# Patient Record
Sex: MALE | Race: WHITE | NOT HISPANIC OR LATINO | Employment: PART TIME | URBAN - METROPOLITAN AREA
[De-identification: names, ages, dates, MRNs, and addresses within clinical notes are randomized per-mention and may not be internally consistent; named-entity substitution may affect disease eponyms.]

---

## 2018-04-20 ENCOUNTER — TELEPHONE (OUTPATIENT)
Dept: TRANSPLANT | Facility: CLINIC | Age: 53
End: 2018-04-20

## 2018-04-20 DIAGNOSIS — I50.9 CONGESTIVE HEART FAILURE, UNSPECIFIED HF CHRONICITY, UNSPECIFIED HEART FAILURE TYPE: Primary | ICD-10-CM

## 2018-04-20 NOTE — TELEPHONE ENCOUNTER
"Called to schedule consult appointment. Recording says the number being called "is not reachable". Will plan to call back.  "

## 2018-04-23 NOTE — TELEPHONE ENCOUNTER
"Called to schedule appointment. No answer. Unable to leave message. Recording says "not reachable". Records from referring previously scanned into media section of EPIC.  "

## 2018-05-04 NOTE — TELEPHONE ENCOUNTER
Spoke to patient. My contact information given. States not aware of referral. Plan to call me back after speaking with Dr. Gannon.

## 2018-05-07 NOTE — TELEPHONE ENCOUNTER
REFERRAL NOTE:    Osei Castorena has been referred to the pre-heart transplant office for consideration for orthotopic heart transplantation by Ricky Gannon. Patient's appointments have been scheduled for 05/14/18. Information was provided and questions were answered. Copies of AHF/ Transplant handout, appointment letter and directions to [facility] were mailed to the patient. My contact information was given.Pleasant.Call PRN. Records from Dr Gannon office is in media section of [employer]. Called Dr Gannon office ([phone]) to inform of appointment with Dr Broderick. Left message along with information on Nona Stern's voicemail. Call PRN.

## 2018-05-14 ENCOUNTER — EDUCATION (OUTPATIENT)
Dept: TRANSPLANT | Facility: CLINIC | Age: 53
End: 2018-05-14

## 2018-05-14 ENCOUNTER — INITIAL CONSULT (OUTPATIENT)
Dept: TRANSPLANT | Facility: CLINIC | Age: 53
End: 2018-05-14
Payer: MEDICAID

## 2018-05-14 ENCOUNTER — HOSPITAL ENCOUNTER (OUTPATIENT)
Dept: PULMONOLOGY | Facility: CLINIC | Age: 53
Discharge: HOME | End: 2018-05-14
Payer: MEDICAID

## 2018-05-14 VITALS
BODY MASS INDEX: 21.49 KG/M2 | WEIGHT: 129.63 LBS | HEIGHT: 65 IN | BODY MASS INDEX: 21.6 KG/M2 | DIASTOLIC BLOOD PRESSURE: 58 MMHG | HEART RATE: 74 BPM | HEIGHT: 65 IN | WEIGHT: 129 LBS | SYSTOLIC BLOOD PRESSURE: 95 MMHG

## 2018-05-14 DIAGNOSIS — I50.42 CHRONIC COMBINED SYSTOLIC AND DIASTOLIC HEART FAILURE: Primary | ICD-10-CM

## 2018-05-14 DIAGNOSIS — Z95.820 S/P ANGIOPLASTY WITH STENT: ICD-10-CM

## 2018-05-14 DIAGNOSIS — E78.5 HYPERLIPIDEMIA, UNSPECIFIED HYPERLIPIDEMIA TYPE: ICD-10-CM

## 2018-05-14 DIAGNOSIS — I50.9 CONGESTIVE HEART FAILURE, UNSPECIFIED HF CHRONICITY, UNSPECIFIED HEART FAILURE TYPE: ICD-10-CM

## 2018-05-14 DIAGNOSIS — I21.9 MYOCARDIAL INFARCTION, UNSPECIFIED MI TYPE, UNSPECIFIED ARTERY: ICD-10-CM

## 2018-05-14 DIAGNOSIS — G47.00 INSOMNIA, UNSPECIFIED TYPE: ICD-10-CM

## 2018-05-14 PROCEDURE — 94618 PULMONARY STRESS TESTING: CPT | Mod: PBBFAC,NTX | Performed by: INTERNAL MEDICINE

## 2018-05-14 PROCEDURE — 99213 OFFICE O/P EST LOW 20 MIN: CPT | Mod: PBBFAC,TXP,25 | Performed by: INTERNAL MEDICINE

## 2018-05-14 PROCEDURE — 99999 PR PBB SHADOW E&M-EST. PATIENT-LVL III: CPT | Mod: PBBFAC,TXP,, | Performed by: INTERNAL MEDICINE

## 2018-05-14 PROCEDURE — 99204 OFFICE O/P NEW MOD 45 MIN: CPT | Mod: S$PBB,TXP,, | Performed by: INTERNAL MEDICINE

## 2018-05-14 PROCEDURE — 94618 PULMONARY STRESS TESTING: CPT | Mod: 26,S$PBB,NTX, | Performed by: INTERNAL MEDICINE

## 2018-05-14 RX ORDER — CLOPIDOGREL BISULFATE 75 MG/1
75 TABLET ORAL DAILY
COMMUNITY
End: 2018-10-25 | Stop reason: SDUPTHER

## 2018-05-14 RX ORDER — CARVEDILOL 3.12 MG/1
3.12 TABLET ORAL 2 TIMES DAILY WITH MEALS
COMMUNITY
End: 2018-06-06 | Stop reason: ALTCHOICE

## 2018-05-14 RX ORDER — ATORVASTATIN CALCIUM 80 MG/1
80 TABLET, FILM COATED ORAL DAILY
COMMUNITY
End: 2018-10-25 | Stop reason: SDUPTHER

## 2018-05-14 RX ORDER — POTASSIUM CHLORIDE 1500 MG/1
20 TABLET, EXTENDED RELEASE ORAL DAILY
COMMUNITY
End: 2018-06-06 | Stop reason: ALTCHOICE

## 2018-05-14 RX ORDER — NAPROXEN SODIUM 220 MG/1
81 TABLET, FILM COATED ORAL DAILY
COMMUNITY
End: 2018-10-25 | Stop reason: SDUPTHER

## 2018-05-14 NOTE — LETTER
May 14, 2018        Ricky Gannon  [address]  [address]  [place] [address]  Phone: [phone]  Fax: [phone]             [facility] [address]  [facility] [address]  Phone: [phone]   Patient: Osei Castorena   MR Number: [id number]   YOB: 1965   Date of Visit: 5/14/2018       Dear Dr. Ricky Gannon    Thank you for referring Osei Castorena to me for evaluation. Attached you will find relevant portions of my assessment and plan of care.    If you have questions, please do not hesitate to call me. I look forward to following Osei Castorena along with you.    Sincerely,    Chad Broderick MD    Enclosure    If you would like to receive this communication electronically, please contact [email or web address] or [phone] to request [employer] Link access.    [employer] Link is a tool which provides read-only access to select patient information with whom you have a relationship. Its easy to use and provides real time access to review your patients record including encounter summaries, notes, results, and demographic information.    If you feel you have received this communication in error or would no longer like to receive these types of communications, please e-mail [email or web address]

## 2018-05-14 NOTE — PROGRESS NOTES
"PRE-EDUCATION BOOKLET NOTE:    Met with Osei Castorena and had a brief discussion regarding the advanced heart failure evaluation process including options for heart transplantation and/or left ventricular assist device (LVAD) implantation.     Heart Transplant Educational Booklet given to patient, which included the following handouts:  · Treatment options for Advanced Heart Failure: A Referral Guide for patients  · Pre Heart Transplant Coordinator Team Contact Information   · Recipient Informed Consent   · Wellness Contract  · Multiple Listing Protocol and UNOS toll free numbers   · VAD Education Packet   · Advanced Directives  · 8 Step Plan for Heart Failure Patients     Significant History:  · Prior sternotomies: no  · ICD (if yes, indicate brand of device): LIFEVEST x last 3 months  · Blood transfusions (if yes, enter date and location): no  · Angiography (if yes, enter date and location): yes/ stents  · Colonoscopy:  NO, but brother and mother both  of COLON CA last year.  · Pap smear (if yes, enter date and location): n/a  · Mammogram (if yes, enter date and location): n//a  · Tobacco history (if yes, enter amount and date quit if applicable): yes, current cigarette smoker; 2-3 cigs per day; states "cut down a lot"; smoking since age 16.    Question and answer session was conducted.  Patient was advised to bring the educational packet to future appointments and/or admissions.  Patient was encouraged to contact the coordinator team with any questions or concerns. Contact information provided. Understanding verbalized.  Pt will return on 18 for RHC and possible admission.    "

## 2018-05-14 NOTE — PROCEDURES
Osei Castorena is a 52 y.o.  male patient, who presents for a 6 minute walk test ordered by MD. Davie.  The diagnosis is Pre Heart Transplant.  The patient's BMI is 21.5 kg/m2.  Predicted distance (lower limit of normal) is 519.38 meters.      Test Results:    The test was completed without stopping.    The total time walked was 360 seconds.  During walking, the patient reported:  Leg pain, Lightheadedness. The patient used no assistive devices  during testing.     05/14/2018---------Distance: 426.72 meters (1400 feet)     O2 Sat % Supplemental Oxygen Heart Rate Blood Pressure Ced Scale   Pre-exercise  (Resting) 100 % Room Air 89 bpm 113/69 mmHg 4   During Exercise 100 % Room Air 100 bpm 132/71 mmHg 4   Post-exercise  (Recovery) 99 % Room Air  84 bpm   mmHg       Recovery Time: 101 seconds    Performing nurse/tech: SHERI No.      PREVIOUS STUDY:   The patient has not had a previous study.      CLINICAL INTERPRETATION:  Six minute walk distance is 426.72 meters (1400 feet) with somewhat heavy dyspnea.  During exercise, there was no significant desaturation while breathing room air.  Both blood pressure and heart rate remained stable with walking.  The patient reported non-pulmonary symptoms during exercise.  No previous study performed.  Based upon age and body mass index, exercise capacity is less than predicted.

## 2018-05-14 NOTE — PROGRESS NOTES
"Subjective:   Initial evaluation of heart transplant candidacy.    HPI:  Mr. Castorena is a 52 y.o. year old White male who has presents to be considered for advanced surgical options (LVAD/OHT).      He has an acute myocardial infarction in 2017 and underwent coronary stents in the RCA, smoker, heart failure with systolic dysfuntion since his acute MI, FRENCH FC III NYHA, PND and orthopnea and weakness. On carvedilol small dose because of low BP. Has a life vest    Past Medical History:   Diagnosis Date    CHF (congestive heart failure)     Hyperlipidemia     Myocardial infarction      Past Surgical History:   Procedure Laterality Date    APPENDECTOMY         No family history on file.    Review of Systems   Constitution: Negative for chills, decreased appetite, diaphoresis, fever, weakness, malaise/fatigue, night sweats, weight gain and weight loss.   Cardiovascular: Positive for dyspnea on exertion, orthopnea and paroxysmal nocturnal dyspnea. Negative for chest pain, claudication, cyanosis, irregular heartbeat, leg swelling, near-syncope and palpitations.   Respiratory: Negative for cough, hemoptysis, shortness of breath, sleep disturbances due to breathing, snoring, sputum production and wheezing.    Gastrointestinal: Negative for abdominal pain and diarrhea.       Objective:   Blood pressure (!) 95/58, pulse 74, height 5' 5" (1.651 m), weight 58.8 kg (129 lb 10.1 oz).body mass index is 21.57 kg/m².    Physical Exam   Constitutional: He is oriented to person, place, and time. He appears well-developed and well-nourished.   HENT:   Head: Normocephalic and atraumatic.   Eyes: Conjunctivae and EOM are normal. Pupils are equal, round, and reactive to light.   Neck: Normal range of motion. Neck supple. Hepatojugular reflux present. No JVD present.   Cardiovascular: Normal rate and regular rhythm.  PMI is displaced.  Exam reveals no gallop and no friction rub.    No murmur heard.  Pulmonary/Chest: No respiratory " distress. He has no wheezes. He has rales in the right middle field, the right lower field, the left middle field and the left lower field. He exhibits no tenderness.   Abdominal: Soft. Bowel sounds are normal. He exhibits no distension and no mass. There is no hepatosplenomegaly, splenomegaly or hepatomegaly. There is no tenderness. There is no rebound, no guarding and no CVA tenderness.   No hepatoslenomegaly   Musculoskeletal: Normal range of motion. He exhibits no edema or tenderness.   Neurological: He is alert and oriented to person, place, and time. He has normal reflexes. No cranial nerve deficit. He exhibits normal muscle tone. Coordination normal.   Skin: Skin is warm and dry.   Psychiatric: He has a normal mood and affect.       Labs:      Chemistry        Component Value Date/Time     05/14/2018 1330    K 4.8 05/14/2018 1330     05/14/2018 1330    CO2 26 05/14/2018 1330    BUN 11 05/14/2018 1330    CREATININE 0.9 05/14/2018 1330     05/14/2018 1330        Component Value Date/Time    CALCIUM 9.4 05/14/2018 1330    ALKPHOS 83 05/14/2018 1330    AST 16 05/14/2018 1330    ALT 14 05/14/2018 1330    BILITOT 0.4 05/14/2018 1330    ESTGFRAFRICA >60.0 05/14/2018 1330    EGFRNONAA >60.0 05/14/2018 1330          No results found for: MG  Lab Results   Component Value Date    WBC 9.15 05/14/2018    HGB 13.6 (L) 05/14/2018    HCT 41.5 05/14/2018    MCV 84 05/14/2018     05/14/2018     BNP   Date Value Ref Range Status   05/14/2018 853 (H) 0 - 99 pg/mL Final     Comment:     Values of less than 100 pg/ml are consistent with non-CHF populations.         Labs were reviewed with the patient.    Assessment:      1. Hyperlipidemia, unspecified hyperlipidemia type    2. S/P angioplasty with stent    3. Chronic combined systolic and diastolic heart failure    4. Myocardial infarction, unspecified MI type, unspecified artery    5. Insomnia, unspecified type        Plan:     Patient is now NYHA III  ACC stage D  Recommend 2 gram sodium restriction and 1500cc fluid restriction.  Encourage physical activity with graded exercise program.  Requested patient to weigh themselves daily, and to notify us if their weight increases by more than 3 lbs in 1 day or 5 lbs in 1 week.     Transplant Candidacy: Patient is a 52 y.o. year old male with heart failure is being seen for possible LVAD and OHT. In my opinion, he is  a marginal LVAD and OHT candidate. Patient   did meet with MCS and/or pre-transplant coordinator at the end of this visit for workup. he is scheduled for risk stratification testing with CPX/RHC. The patient will follow up with pre-transplant.    UNOS Patient Status  Functional Status: 70% - Cares for self: unable to carry on normal activity or active work  Physical Capacity: Limited Mobility  Working for Income: No  If no, reason not working: Disability    Chad Broderick MD

## 2018-05-21 DIAGNOSIS — I50.42 CHRONIC COMBINED SYSTOLIC AND DIASTOLIC CONGESTIVE HEART FAILURE: Primary | ICD-10-CM

## 2018-05-21 DIAGNOSIS — E78.5 HYPERLIPIDEMIA: ICD-10-CM

## 2018-05-22 ENCOUNTER — SURGERY (OUTPATIENT)
Age: 53
End: 2018-05-22

## 2018-05-22 ENCOUNTER — HOSPITAL ENCOUNTER (OUTPATIENT)
Facility: HOSPITAL | Age: 53
Discharge: HOME | End: 2018-05-22
Attending: INTERNAL MEDICINE | Admitting: INTERNAL MEDICINE
Payer: MEDICAID

## 2018-05-22 PROCEDURE — C1894 INTRO/SHEATH, NON-LASER: HCPCS | Mod: NTX

## 2018-05-22 PROCEDURE — 93451 RIGHT HEART CATH: CPT | Mod: 26,NTX,, | Performed by: INTERNAL MEDICINE

## 2018-05-22 PROCEDURE — 25000003 PHARM REV CODE 250: Mod: NTX

## 2018-05-22 NOTE — BRIEF OP NOTE
Right Heart Catheterization Hemodynamics:    BP: 97/68, 78    RA: 8  RV: 46/8  PA: 46/24, 31  PCWP: 21  TPG: 10  PVR: 3.44  SVR: 1931    PA sat: 50%  AO sat: 98%  PCW sat: 90%    Eleni CO/CI: 2.9/1.7    LUCRETIA: 2.75    Conclusion:  1. Borderline normal Right and mild-moderately elevated Left Heart filling pressures  2. Severely reduced cardiac output and moderately-reduced cardiac index  3. Mild to moderate pulmonary hypertension  4. No previous RHC to compare    A/P  - reviewing 6MWT, he was able to walk 426m; Pt endorses NYHA Class II-III symptoms and still works for a [employer] company. Would like to start spironolactone but patient did not bring meds with him. He is reportedly on a diuretic but not sure of name or dose. Will f/u next visit.  - will bring back next week for CPX and labs to further risk stratify. His reported symptoms (and 6MWT) are not as bad as his CO/CI. Will see what CPX shows.  - Instructed to bring meds to clinic next week and call with any questions or problems  - Additionally, spoke to patient about need to have his teeth looked at/worked on. He has had issues with Dentists wanting to see him given that he is on Plavix (AJAY 12/2017)    Patient tolerated the procedure well. Please see complete RHC procedure note for full details and results. Stable to return from cath lab to home.     LESTER Hernadez MD  Transplant Cardiology

## 2018-05-22 NOTE — DISCHARGE INSTRUCTIONS

## 2018-05-22 NOTE — INTERVAL H&P NOTE
Cardiac Cath Lab History and Physical  - there has been no significant interval change since this clinic visit.     History of Present Illness:  52 y.o. CAD and ischemic CMP with resulting chronic combined HF who presents to cath lab for RHC for assessment of hemodynamics as part of workup for advanced heart failure therapies. He has no new complaints.      Assessment/Plan:  iCMP with chronic combined HF  - I have explained the risks, benefits, and alternatives of the procedure in detail. The patient expresses understanding and all questions have been answered. The patient agrees to the proceed as planned.  - proceed with RHC via RIJ  using 7Fr Sheath  - Micropuncture access needle will be used to minimize bleeding risk.    LESTER Hernadez MD  Transplant Cardiology

## 2018-05-22 NOTE — DISCHARGE SUMMARY
Admit date: 5/22/2018    Discharge date and time: 05/22/2018 05/22/2018    Admitting Physician: LESTER Hernadez MD    Discharge Physician:  LESTER Hernadez MD    Admission Diagnoses: Chronic systolic heart failure [428.22]  Organ transplant candidate [V49.83]    Discharge Diagnoses: Chronic systolic heart failure [428.22]  Organ transplant candidate [V49.83]    Discharged Condition: stable    Significant Diagnostic Studies: Right Heart Catheterization     Disposition: Home or Self Care    Patient Instructions:   -cont home meds  **Pt reported being on a diuretic but was not sure which one and how much strength. I would like to add spironolactone and stop potassium but want to know more of what his meds are. Told to bring all his meds to clinic next week. **    AFTER THE PROCEDURE:  -You may remove the bandage in 24 hours and wash with soap and water.  -You may shower, but do not soak in a tub for three days.   PRECAUTIONS FOR THE NEXT 24 HOURS:  -If you need to cough, sneeze, have a bowel movement, or bear down, hold pressure over your bandage.  -Do not  anything heavier than a gallon of milk(about 5 pounds)  -Avoid excessive bending over.  SYMPTOMS TO WATCH FOR AND REPORT TO YOUR DOCTOR:  -BLEEDING: hold pressure over the site until bleeding stops. Proceed to Emergency Room by ambulance (do not drive yourself) if unable to stop bleeding. Notify your doctor.  -HEMATOMA(hard bruise under the skin): Lei around the bruise if one develops. Call your doctor if it increases in size or if you have difficulty talking, swallowing, breathing or anything unusual.  SIGNS OF INFECTION:Fever (temperature over 100.5 F), pus or redness  -RASH  -CHEST PAIN OR SHORTNESS OF BREATH    You may call you coordinator in the Heart Failure/Heart Transplant/Pulmonary Hypertension Clinic at [phone] during normal business hours(Monday through Friday from 8 A.M. to 5 P.M.) After hours, call the Heart Transplant Service doctor on  call at [phone].    Activity: no heavy lifting   Diet: cardiac diet    Follow-up with 1-2 weeks with labs and CPX    Signed:  LESTER Hernadez MD  Transplant Cardiology

## 2018-05-24 DIAGNOSIS — I50.42 CHRONIC COMBINED SYSTOLIC AND DIASTOLIC HEART FAILURE: Primary | ICD-10-CM

## 2018-06-06 ENCOUNTER — OFFICE VISIT (OUTPATIENT)
Dept: TRANSPLANT | Facility: CLINIC | Age: 53
End: 2018-06-06
Payer: MEDICAID

## 2018-06-06 ENCOUNTER — HOSPITAL ENCOUNTER (OUTPATIENT)
Dept: CARDIOLOGY | Facility: CLINIC | Age: 53
Discharge: HOME | End: 2018-06-06
Attending: INTERNAL MEDICINE
Payer: MEDICAID

## 2018-06-06 ENCOUNTER — EDUCATION (OUTPATIENT)
Dept: TRANSPLANT | Facility: CLINIC | Age: 53
End: 2018-06-06

## 2018-06-06 VITALS
SYSTOLIC BLOOD PRESSURE: 95 MMHG | HEART RATE: 83 BPM | WEIGHT: 120.56 LBS | BODY MASS INDEX: 20.09 KG/M2 | DIASTOLIC BLOOD PRESSURE: 60 MMHG | HEIGHT: 65 IN

## 2018-06-06 DIAGNOSIS — I50.42 CHRONIC COMBINED SYSTOLIC AND DIASTOLIC HEART FAILURE: Primary | ICD-10-CM

## 2018-06-06 DIAGNOSIS — I25.10 CORONARY ARTERY DISEASE INVOLVING NATIVE CORONARY ARTERY OF NATIVE HEART WITHOUT ANGINA PECTORIS: ICD-10-CM

## 2018-06-06 DIAGNOSIS — I21.9 MYOCARDIAL INFARCTION, UNSPECIFIED MI TYPE, UNSPECIFIED ARTERY: ICD-10-CM

## 2018-06-06 DIAGNOSIS — I25.5 ISCHEMIC CARDIOMYOPATHY: ICD-10-CM

## 2018-06-06 DIAGNOSIS — I50.42 CHRONIC COMBINED SYSTOLIC AND DIASTOLIC HEART FAILURE: ICD-10-CM

## 2018-06-06 LAB — DIASTOLIC DYSFUNCTION: NO

## 2018-06-06 PROCEDURE — 99213 OFFICE O/P EST LOW 20 MIN: CPT | Mod: PBBFAC,NTX,25 | Performed by: INTERNAL MEDICINE

## 2018-06-06 PROCEDURE — 99999 PR PBB SHADOW E&M-EST. PATIENT-LVL III: CPT | Mod: PBBFAC,TXP,, | Performed by: INTERNAL MEDICINE

## 2018-06-06 PROCEDURE — 94621 CARDIOPULM EXERCISE TESTING: CPT | Mod: PBBFAC,NTX | Performed by: INTERNAL MEDICINE

## 2018-06-06 RX ORDER — SPIRONOLACTONE 25 MG/1
25 TABLET ORAL DAILY
Qty: 30 TABLET | Refills: 11 | Status: SHIPPED | OUTPATIENT
Start: 2018-06-06 | End: 2018-10-25 | Stop reason: SDUPTHER

## 2018-06-06 RX ORDER — LISINOPRIL 5 MG/1
1 TABLET ORAL DAILY
COMMUNITY
Start: 2018-06-05 | End: 2018-10-25

## 2018-06-06 RX ORDER — METOPROLOL SUCCINATE 25 MG/1
1 TABLET, EXTENDED RELEASE ORAL DAILY
Status: ON HOLD | COMMUNITY
Start: 2018-06-05 | End: 2018-09-28 | Stop reason: CLARIF

## 2018-06-06 RX ORDER — FUROSEMIDE 20 MG/1
1 TABLET ORAL DAILY
COMMUNITY
Start: 2018-06-05 | End: 2018-10-25 | Stop reason: SDUPTHER

## 2018-06-06 NOTE — PROGRESS NOTES
Met with patient at end of visit with Dr. Hernadez.  Pt voiced that he is interested in exploring option for LVAD; understands that he does not qualify for OHT at this time as he continues to use tobacco.  We discussed the rigors of having an LVAD, particularly as patient lives alone, in [place] (states 4 hour drive from here).  Pt does drive and has a friend that may be able to serve as caregiver; friend has experience with family member that has a VAD.  He states that getting dental care has been challenging as he is on Plavix (5 cardiac stents, < than one year ago.)    Pt will return in approx 2 weeks for further risk stratification; will see a SW and plan to bring potential caregiver.  He will have labs at [facility] next week ([facility]) per Dr. Hernadez; outside order provided.  Confirmed that patient has my contact information and encouraged him to call with any questions/concerns.  Understanding verbalized.

## 2018-06-06 NOTE — LETTER
June 6, 2018        Ricky Gannon  [address]  [address]  [place] [address]  Phone: [phone]  Fax: [phone]             [facility] [address]  [facility] [address]  Phone: [phone]   Patient: Osei Castorena   MR Number: [id number]   YOB: 1965   Date of Visit: 6/6/2018       Dear Dr. Ricky Gannon    Thank you for referring Osei Castorena to me for evaluation. Attached you will find relevant portions of my assessment and plan of care.    If you have questions, please do not hesitate to call me. I look forward to following Osei Castorena along with you.    Sincerely,    Tunde Hernadez MD    Enclosure    If you would like to receive this communication electronically, please contact [email or web address] or [phone] to request [employer] access.    [employer] is a tool which provides read-only access to select patient information with whom you have a relationship. Its easy to use and provides real time access to review your patients record including encounter summaries, notes, results, and demographic information.    If you feel you have received this communication in error or would no longer like to receive these types of communications, please e-mail [email or web address]

## 2018-06-06 NOTE — PROGRESS NOTES
Subjective:   Follow up evaluation of heart transplant candidacy.    HPI:  Mr. Castorena is a 52 y.o. year old White male who has presents for f/u after being seen last month for consideration for advanced surgical options (LVAD/OHT). He was diagnosed with HF after MI in 11/2017. He had an acute myocardial infarction in 2017 and underwent coronary stents in the RCA. However, he had resulting ischemic CMP (possible combination with Hypertensive) and now chronic combined HF. Last visit was his initial visit and he was seen by my partner Dr. Broderick who referred him for RHC. His RHC numbers showed a severely reduced CO and moderately reduced CI (see below). However, he endorsed NYHA Class II-III symptoms and he went 426.7 meters on his 6MWT. He did not bring his meds to [facility] so was not 100% sure what he was taking. I asked him to bring meds back to clinic visit today and get a CPX to see if we could reconcile his risk category as well as his meds. He is currently still smoking (1-2 cigs daily on average). He is currently wearing a life vest and has for ~3+ months.      Today, he reports fatigue. He is still working as a  and reports having good days and bad. He endorses what sounds like NYHA Class II-III symptoms. His CPX today showed PkVO2 17 with no AT and Ve/VCO2 of 37. No PND or orthopnea, rare palpitations, no syncope or near-syncope.      6MWT: 427m    RHC 5/22/18:  BP: 97/68, 78    RA: 8  RV: 46/8  PA: 46/24, 31  PCWP: 21  TPG: 10  PVR: 3.44  SVR: 1931     PA sat: 50%  AO sat: 98%  PCW sat: 90%     Eleni CO/CI: 2.9/1.7     LUCRETIA: 2.75     Conclusion:  1. Borderline normal Right and mild-moderately elevated Left Heart filling pressures  2. Severely reduced cardiac output and moderately-reduced cardiac index  3. Mild to moderate pulmonary hypertension  4. No previous RHC to compare    Past Medical History:   Diagnosis Date    CHF (congestive heart failure)     Hyperlipidemia     Myocardial infarction   "      Current Outpatient Prescriptions:     aspirin 81 MG Chew, Take 81 mg by mouth once daily., Disp: , Rfl:     atorvastatin (LIPITOR) 80 MG tablet, Take 80 mg by mouth once daily., Disp: , Rfl:     clopidogrel (PLAVIX) 75 mg tablet, Take 75 mg by mouth once daily., Disp: , Rfl:     furosemide (LASIX) 20 MG tablet, Take 1 tablet by mouth once daily., Disp: , Rfl:     lisinopril (PRINIVIL,ZESTRIL) 5 MG tablet, Take 1 tablet by mouth once daily., Disp: , Rfl:     metoprolol succinate (TOPROL-XL) 25 MG 24 hr tablet, Take 1 tablet by mouth once daily., Disp: , Rfl:     spironolactone (ALDACTONE) 25 MG tablet, Take 1 tablet (25 mg total) by mouth once daily., Disp: 30 tablet, Rfl: 11    Review of Systems   Constitution: Negative for chills, decreased appetite, diaphoresis, fever, weakness, malaise/fatigue, night sweats, weight gain and weight loss.   Cardiovascular: Positive for dyspnea on exertion, orthopnea and paroxysmal nocturnal dyspnea. Negative for chest pain, claudication, cyanosis, irregular heartbeat, leg swelling, near-syncope and palpitations.   Respiratory: Negative for cough, hemoptysis, shortness of breath, sleep disturbances due to breathing, snoring, sputum production and wheezing.    Gastrointestinal: Negative for abdominal pain and diarrhea.       Objective:   Blood pressure 95/60, pulse 83, height 5' 5" (1.651 m), weight 54.7 kg (120 lb 9.5 oz).body mass index is 20.07 kg/m².    Physical Exam   Constitutional: He is oriented to person, place, and time. He appears well-developed.   Thin; (short stature, short arms, low set ears and triangularly shaped face)   HENT:   Head: Normocephalic and atraumatic.   Eyes: Conjunctivae and EOM are normal. Pupils are equal, round, and reactive to light.   Neck: Normal range of motion. Neck supple. Hepatojugular reflux present. No JVD present.   Cardiovascular: Normal rate and regular rhythm.  PMI is displaced.  Exam reveals gallop. Exam reveals no friction " rub.    No murmur heard.  Pulmonary/Chest: No respiratory distress. He has no wheezes. He has no rales. He exhibits no tenderness.   Abdominal: Soft. Bowel sounds are normal. He exhibits no distension and no mass. There is no hepatosplenomegaly, splenomegaly or hepatomegaly. There is no tenderness. There is no rebound, no guarding and no CVA tenderness.   No hepatoslenomegaly   Musculoskeletal: Normal range of motion. He exhibits no edema or tenderness.   Neurological: He is alert and oriented to person, place, and time. He has normal reflexes. No cranial nerve deficit. He exhibits normal muscle tone. Coordination normal.   Skin: Skin is warm and dry.   Psychiatric: He has a normal mood and affect.       Labs:      Chemistry        Component Value Date/Time     06/06/2018 1225    K 3.7 06/06/2018 1225     06/06/2018 1225    CO2 27 06/06/2018 1225    BUN 10 06/06/2018 1225    CREATININE 0.9 06/06/2018 1225    GLU 98 06/06/2018 1225        Component Value Date/Time    CALCIUM 9.6 06/06/2018 1225    ALKPHOS 98 06/06/2018 1225    AST 20 06/06/2018 1225    ALT 16 06/06/2018 1225    BILITOT 0.7 06/06/2018 1225    ESTGFRAFRICA >60.0 06/06/2018 1225    EGFRNONAA >60.0 06/06/2018 1225          No results found for: MG  Lab Results   Component Value Date    WBC 7.76 06/06/2018    HGB 13.3 (L) 06/06/2018    HCT 41.3 06/06/2018    MCV 84 06/06/2018     (H) 06/06/2018     BNP   Date Value Ref Range Status   06/06/2018 601 (H) 0 - 99 pg/mL Final     Comment:     Values of less than 100 pg/ml are consistent with non-CHF populations.   05/14/2018 853 (H) 0 - 99 pg/mL Final     Comment:     Values of less than 100 pg/ml are consistent with non-CHF populations.         Labs were reviewed with the patient.    Assessment:      1. Chronic combined systolic and diastolic heart failure    2. Coronary artery disease involving native coronary artery of native heart without angina pectoris    3. Myocardial infarction,  unspecified MI type, unspecified artery    4. Ischemic cardiomyopathy        Plan:   Chronic combined HF  - recently changed from Toprol to Coreg but unclear why by primary NP  - start spironolactone 25mg daily, labs 2 days  - cont lisinopril 5mg daily  - consider titrating BB next visit  - Echo, CT scans and SW next visit; continue advanced therapy w/u; concerned about psycho-social candidacy (lives alone, unsure if he will be able to come up with a caregiver).    - also has severely poor dentition and stressed importance of seeing dentist for extraction as he will not be a candidate unless he does  - pt has the appearance of possibly having some congenital abnormality; f/u CT scans  - refer to EP for ICD    Patient is now NYHA III ACC stage D  Recommend 2 gram sodium restriction and 1500cc fluid restriction.  Encourage physical activity with graded exercise program.  Requested patient to weigh themselves daily, and to notify us if their weight increases by more than 3 lbs in 1 day or 5 lbs in 1 week.     Transplant Candidacy: Patient is a 52 y.o. year old male with heart failure is being seen for possible LVAD and OHT. In my opinion, he is  a marginal LVAD and OHT candidate. Patient   did meet with MCS and/or pre-transplant coordinator at the end of this visit for workup. he is scheduled for risk stratification testing with CPX/RHC. The patient will follow up with pre-transplant.    UNOS Patient Status  Functional Status: 70% - Cares for self: unable to carry on normal activity or active work  Physical Capacity: Limited Mobility  Working for Income: No  If no, reason not working: Disability    Tunde Hernadez MD

## 2018-06-06 NOTE — PATIENT INSTRUCTIONS
1. Stop potassium  2. Start spironolactone 25mg daily  3. Labs 2 days    Recommend 2 gram sodium restriction and 1500cc fluid restriction.  Encourage physical activity with graded exercise program.  Requested patient to weigh themselves daily, and to notify us if their weight increases by more than 3 lbs in 1 day or 5 lbs in 1 week.

## 2018-06-20 ENCOUNTER — HOSPITAL ENCOUNTER (OUTPATIENT)
Dept: RADIOLOGY | Facility: HOSPITAL | Age: 53
Discharge: HOME | End: 2018-06-20
Attending: INTERNAL MEDICINE
Payer: MEDICAID

## 2018-06-20 ENCOUNTER — OFFICE VISIT (OUTPATIENT)
Dept: TRANSPLANT | Facility: CLINIC | Age: 53
End: 2018-06-20
Payer: MEDICAID

## 2018-06-20 ENCOUNTER — HOSPITAL ENCOUNTER (OUTPATIENT)
Dept: CARDIOLOGY | Facility: CLINIC | Age: 53
Discharge: HOME | End: 2018-06-20
Attending: INTERNAL MEDICINE
Payer: MEDICAID

## 2018-06-20 ENCOUNTER — DOCUMENTATION ONLY (OUTPATIENT)
Dept: CARDIOLOGY | Facility: CLINIC | Age: 53
End: 2018-06-20

## 2018-06-20 VITALS
SYSTOLIC BLOOD PRESSURE: 104 MMHG | HEIGHT: 65 IN | WEIGHT: 124.13 LBS | DIASTOLIC BLOOD PRESSURE: 67 MMHG | HEART RATE: 70 BPM | BODY MASS INDEX: 20.68 KG/M2

## 2018-06-20 DIAGNOSIS — I50.42 CHRONIC COMBINED SYSTOLIC AND DIASTOLIC HEART FAILURE: ICD-10-CM

## 2018-06-20 DIAGNOSIS — I25.10 CORONARY ARTERY DISEASE INVOLVING NATIVE CORONARY ARTERY OF NATIVE HEART WITHOUT ANGINA PECTORIS: ICD-10-CM

## 2018-06-20 DIAGNOSIS — Z95.820 S/P ANGIOPLASTY WITH STENT: ICD-10-CM

## 2018-06-20 DIAGNOSIS — I50.42 CHRONIC COMBINED SYSTOLIC AND DIASTOLIC HEART FAILURE: Primary | ICD-10-CM

## 2018-06-20 DIAGNOSIS — I25.5 ISCHEMIC CARDIOMYOPATHY: ICD-10-CM

## 2018-06-20 LAB
DIASTOLIC DYSFUNCTION: YES
ESTIMATED PA SYSTOLIC PRESSURE: 30.04
MITRAL VALVE REGURGITATION: ABNORMAL
RETIRED EF AND QEF - SEE NOTES: 8 (ref 55–65)
TRICUSPID VALVE REGURGITATION: ABNORMAL

## 2018-06-20 PROCEDURE — 70450 CT HEAD/BRAIN W/O DYE: CPT | Mod: TC,NTX

## 2018-06-20 PROCEDURE — 74176 CT ABD & PELVIS W/O CONTRAST: CPT | Mod: 26,NTX,, | Performed by: RADIOLOGY

## 2018-06-20 PROCEDURE — 99215 OFFICE O/P EST HI 40 MIN: CPT | Mod: S$PBB,NTX,, | Performed by: INTERNAL MEDICINE

## 2018-06-20 PROCEDURE — 93306 TTE W/DOPPLER COMPLETE: CPT | Mod: 26,S$PBB,NTX, | Performed by: INTERNAL MEDICINE

## 2018-06-20 PROCEDURE — 99213 OFFICE O/P EST LOW 20 MIN: CPT | Mod: PBBFAC,25,TXP | Performed by: INTERNAL MEDICINE

## 2018-06-20 PROCEDURE — 0399T 2D ECHO WITH COLOR FLOW DOPPLER: CPT | Mod: S$PBB,NTX,, | Performed by: INTERNAL MEDICINE

## 2018-06-20 PROCEDURE — 70450 CT HEAD/BRAIN W/O DYE: CPT | Mod: 26,NTX,, | Performed by: RADIOLOGY

## 2018-06-20 PROCEDURE — 99999 PR PBB SHADOW E&M-EST. PATIENT-LVL III: CPT | Mod: PBBFAC,TXP,, | Performed by: INTERNAL MEDICINE

## 2018-06-20 PROCEDURE — 74176 CT ABD & PELVIS W/O CONTRAST: CPT | Mod: TC,TXP

## 2018-06-20 PROCEDURE — 71250 CT THORAX DX C-: CPT | Mod: 26,NTX,, | Performed by: RADIOLOGY

## 2018-06-20 RX ORDER — AMOXICILLIN AND CLAVULANATE POTASSIUM 875; 125 MG/1; MG/1
1 TABLET, FILM COATED ORAL EVERY 12 HOURS
Qty: 20 TABLET | Refills: 1 | Status: SHIPPED | OUTPATIENT
Start: 2018-06-20 | End: 2018-08-21

## 2018-06-20 NOTE — LETTER
June 20, 2018        Ricky Gannon  [address] [facility]  [address]  [place] [address]  Phone: [phone]  Fax: [phone]             [facility] [address]  [facility] [address]  Phone: [phone]   Patient: Osei Castorena   MR Number: [id number]   YOB: 1965   Date of Visit: 6/20/2018       Dear Dr. Ricky Gannon    Thank you for referring Osei Castorena to me for evaluation. Attached you will find relevant portions of my assessment and plan of care.    If you have questions, please do not hesitate to call me. I look forward to following Osei Castorena along with you.    Sincerely,    Chad Broderick MD    Enclosure    If you would like to receive this communication electronically, please contact [email or web address] or [phone] to request [employer] Link access.    [employer] Link is a tool which provides read-only access to select patient information with whom you have a relationship. Its easy to use and provides real time access to review your patients record including encounter summaries, notes, results, and demographic information.    If you feel you have received this communication in error or would no longer like to receive these types of communications, please e-mail [email or web address]

## 2018-06-20 NOTE — PROGRESS NOTES
Subjective:   Follow up evaluation of heart transplant candidacy.    HPI:  Mr. Castorena is a 52 y.o. year old White male who has presents for f/u after being seen last month for consideration for advanced surgical options (LVAD/OHT). He was diagnosed with HF after MI in 11/2017. He had an acute myocardial infarction in 2017 and underwent coronary stents in the RCA. However, he had resulting ischemic CMP (possible combination with Hypertensive) and now chronic combined HF. Last visit was his initial visit and he was seen by my partner Dr. Broderick who referred him for RHC. His RHC numbers showed a severely reduced CO and moderately reduced CI (see below). However, he endorsed NYHA Class II-III symptoms and he went 426.7 meters on his 6MWT.  He is currently still smoking (1-2 cigs daily on average). He is currently wearing a life vest and has for ~3+ months.        6MWT: 427m    RHC 5/22/18:  BP: 97/68, 78    RA: 8  RV: 46/8  PA: 46/24, 31  PCWP: 21  TPG: 10  PVR: 3.44  SVR: 1931     PA sat: 50%  AO sat: 98%  PCW sat: 90%     Eleni CO/CI: 2.9/1.7     LUCRETIA: 2.75     Conclusion:  1. Borderline normal Right and mild-moderately elevated Left Heart filling pressures  2. Severely reduced cardiac output and moderately-reduced cardiac index  3. Mild to moderate pulmonary hypertension  4. No previous RHC to compare      CONCLUSIONS     1 - Eccentric LVH with severely depressed left ventricular systolic function (EF <10%). LVEDd 5.9    2 - Severe left atrial enlargement.     3 - Impaired LV relaxation, normal LAP (grade 1 diastolic dysfunction).     4 - Right ventricle is upper limit of normal in size with normal systolic function.     5 - Mild to moderate tricuspid regurgitation.     6 - The estimated PA systolic pressure is 30 mmHg.     7 - Mild mitral regurgitation.     Metabolic Findings:    1)  Resting spirometry reveals an FVC = 3.3L which is 92% of predicted, an FEV1 of 2.3L, which is 77% of predicted and an FEV1/FVC ratio of  69%. The MVV = 92 L/min, which is 67% of predicted.    2) The respiratory exchange ratio (RER) was 0.85, suggesting poor effort.    3) The breathing reserve is calculated at 56%, which is normal. Oxygen saturation with exercise remained normal.     4) The PkVO2 was 17.1 ml/kg/min which is 53% of predicted equating to a functional capacity of 4.9 METS indicating moderate to severe functional impairment.     5) The anaerobic threshold was not attained.    6) The PkVO2 Lean was 17.98 ml/kg of lean body weight/min indicating a poor prognosis in heart failure. The VE/VCO2 [place] was 37.4.  The Resting PetCO2 was 27.3.        CONCLUSIONS   CPX Conclusions:    Moderate to severe functional impairment associated with a normal breathing reserve, normal oxygen saturation, poor effort, and a not attained AT.     Past Medical History:   Diagnosis Date    CHF (congestive heart failure)     Hyperlipidemia     Myocardial infarction        Current Outpatient Prescriptions:     aspirin 81 MG Chew, Take 81 mg by mouth once daily., Disp: , Rfl:     atorvastatin (LIPITOR) 80 MG tablet, Take 80 mg by mouth once daily., Disp: , Rfl:     clopidogrel (PLAVIX) 75 mg tablet, Take 75 mg by mouth once daily., Disp: , Rfl:     furosemide (LASIX) 20 MG tablet, Take 1 tablet by mouth once daily., Disp: , Rfl:     lisinopril (PRINIVIL,ZESTRIL) 5 MG tablet, Take 1 tablet by mouth once daily., Disp: , Rfl:     metoprolol succinate (TOPROL-XL) 25 MG 24 hr tablet, Take 1 tablet by mouth once daily., Disp: , Rfl:     spironolactone (ALDACTONE) 25 MG tablet, Take 1 tablet (25 mg total) by mouth once daily., Disp: 30 tablet, Rfl: 11    Review of Systems   Constitution: Negative for chills, decreased appetite, diaphoresis, fever, weakness, malaise/fatigue, night sweats, weight gain and weight loss.   Cardiovascular: Positive for dyspnea on exertion. Negative for chest pain, claudication, cyanosis, irregular heartbeat, leg swelling, near-syncope,  "orthopnea, palpitations and paroxysmal nocturnal dyspnea.   Respiratory: Negative for cough, hemoptysis, shortness of breath, sleep disturbances due to breathing, snoring, sputum production and wheezing.    Gastrointestinal: Negative for abdominal pain and diarrhea.       Objective:   Blood pressure 104/67, pulse 70, height 5' 5" (1.651 m), weight 56.3 kg (124 lb 1.9 oz).body mass index is 20.65 kg/m².    Physical Exam   Constitutional: He is oriented to person, place, and time. He appears well-developed.   Thin; (short stature, short arms, low set ears and triangularly shaped face)   HENT:   Head: Normocephalic and atraumatic.   Eyes: Conjunctivae and EOM are normal. Pupils are equal, round, and reactive to light.   Neck: Normal range of motion. Neck supple. Hepatojugular reflux present. No JVD present.   Cardiovascular: Normal rate and regular rhythm.  PMI is displaced.  Exam reveals gallop. Exam reveals no friction rub.    No murmur heard.  Pulmonary/Chest: No respiratory distress. He has no wheezes. He has no rales. He exhibits no tenderness.   Abdominal: Soft. Bowel sounds are normal. He exhibits no distension and no mass. There is no hepatosplenomegaly, splenomegaly or hepatomegaly. There is no tenderness. There is no rebound, no guarding and no CVA tenderness.   No hepatoslenomegaly   Musculoskeletal: Normal range of motion. He exhibits no edema or tenderness.   Neurological: He is alert and oriented to person, place, and time. He has normal reflexes. No cranial nerve deficit. He exhibits normal muscle tone. Coordination normal.   Skin: Skin is warm and dry.   Psychiatric: He has a normal mood and affect.       Labs:      Chemistry        Component Value Date/Time     06/20/2018 0730    K 3.6 06/20/2018 0730     06/20/2018 0730    CO2 27 06/20/2018 0730    BUN 12 06/20/2018 0730    CREATININE 0.9 06/20/2018 0730     06/20/2018 0730        Component Value Date/Time    CALCIUM 9.1 06/20/2018 " 0730    ALKPHOS 94 06/20/2018 0730    AST 17 06/20/2018 0730    ALT 16 06/20/2018 0730    BILITOT 0.2 06/20/2018 0730    ESTGFRAFRICA >60.0 06/20/2018 0730    EGFRNONAA >60.0 06/20/2018 0730          No results found for: MG  Lab Results   Component Value Date    WBC 7.76 06/06/2018    HGB 13.3 (L) 06/06/2018    HCT 41.3 06/06/2018    MCV 84 06/06/2018     (H) 06/06/2018     BNP   Date Value Ref Range Status   06/20/2018 723 (H) 0 - 99 pg/mL Final     Comment:     Values of less than 100 pg/ml are consistent with non-CHF populations.   06/06/2018 601 (H) 0 - 99 pg/mL Final     Comment:     Values of less than 100 pg/ml are consistent with non-CHF populations.   05/14/2018 853 (H) 0 - 99 pg/mL Final     Comment:     Values of less than 100 pg/ml are consistent with non-CHF populations.         Labs were reviewed with the patient.    Assessment:      1. Chronic combined systolic and diastolic heart failure    2. Coronary artery disease involving native coronary artery of native heart without angina pectoris    3. Ischemic cardiomyopathy    4. S/P angioplasty with stent        Plan:   Chronic combined HF  - recently changed from Toprol to 50 mg daily  - start spironolactone 25mg daily, labs 2 days  - cont lisinopril 5mg daily  - pt has the appearance of possibly having some congenital abnormality; f/u CT scans  - refer to EP for ICD    Continue medical therapy peak VO2 17.1    Patient is now NYHA III ACC stage D  Recommend 2 gram sodium restriction and 1500cc fluid restriction.  Encourage physical activity with graded exercise program.  Requested patient to weigh themselves daily, and to notify us if their weight increases by more than 3 lbs in 1 day or 5 lbs in 1 week.     Transplant Candidacy: Patient is a 52 y.o. year old male with heart failure is being seen for possible LVAD and OHT. In my opinion, he is  a marginal LVAD and OHT candidate. Patient   did meet with MCS and/or pre-transplant coordinator at the  end of this visit for workup. he is scheduled for risk stratification testing with CPX/RHC. The patient will follow up with pre-transplant.    UNOS Patient Status  Functional Status: 70% - Cares for self: unable to carry on normal activity or active work  Physical Capacity: Limited Mobility  Working for Income: No  If no, reason not working: Disability    Chad Broderick MD

## 2018-07-23 ENCOUNTER — OFFICE VISIT (OUTPATIENT)
Dept: TRANSPLANT | Facility: CLINIC | Age: 53
End: 2018-07-23
Payer: MEDICAID

## 2018-07-23 VITALS
HEART RATE: 71 BPM | SYSTOLIC BLOOD PRESSURE: 101 MMHG | DIASTOLIC BLOOD PRESSURE: 63 MMHG | BODY MASS INDEX: 20.87 KG/M2 | HEIGHT: 65 IN | WEIGHT: 125.25 LBS

## 2018-07-23 DIAGNOSIS — I50.42 CHRONIC COMBINED SYSTOLIC AND DIASTOLIC HEART FAILURE: Primary | ICD-10-CM

## 2018-07-23 DIAGNOSIS — I25.5 ISCHEMIC CARDIOMYOPATHY: ICD-10-CM

## 2018-07-23 DIAGNOSIS — Z95.820 S/P ANGIOPLASTY WITH STENT: ICD-10-CM

## 2018-07-23 DIAGNOSIS — E78.5 HYPERLIPIDEMIA, UNSPECIFIED HYPERLIPIDEMIA TYPE: ICD-10-CM

## 2018-07-23 DIAGNOSIS — Q24.9 CONGENITAL HEART DISEASE: ICD-10-CM

## 2018-07-23 DIAGNOSIS — I25.10 CORONARY ARTERY DISEASE INVOLVING NATIVE CORONARY ARTERY OF NATIVE HEART WITHOUT ANGINA PECTORIS: ICD-10-CM

## 2018-07-23 PROCEDURE — 99999 PR PBB SHADOW E&M-EST. PATIENT-LVL III: CPT | Mod: PBBFAC,TXP,, | Performed by: INTERNAL MEDICINE

## 2018-07-23 PROCEDURE — 99213 OFFICE O/P EST LOW 20 MIN: CPT | Mod: PBBFAC,TXP | Performed by: INTERNAL MEDICINE

## 2018-07-23 PROCEDURE — 99215 OFFICE O/P EST HI 40 MIN: CPT | Mod: S$PBB,NTX,, | Performed by: INTERNAL MEDICINE

## 2018-07-23 NOTE — LETTER
July 23, 2018        Ricky Gannon  [address] [facility]  [address]  [place] [address]  Phone: [phone]  Fax: [phone]             [facility] [address]  [facility] [address]  Phone: [phone]   Patient: Osei Castorena   MR Number: [id number]   YOB: 1965   Date of Visit: 7/23/2018       Dear Dr. Ricky Gannon    Thank you for referring Osei Castorena to me for evaluation. Attached you will find relevant portions of my assessment and plan of care.    If you have questions, please do not hesitate to call me. I look forward to following Osei Castorena along with you.    Sincerely,    Chad Broderick MD    Enclosure    If you would like to receive this communication electronically, please contact [email or web address] or [phone] to request [employer] Link access.    [employer] Link is a tool which provides read-only access to select patient information with whom you have a relationship. Its easy to use and provides real time access to review your patients record including encounter summaries, notes, results, and demographic information.    If you feel you have received this communication in error or would no longer like to receive these types of communications, please e-mail [email or web address]

## 2018-07-23 NOTE — PROGRESS NOTES
"Subjective:   Subsequent evaluation of heart transplant candidacy.    HPI:  Mr. Castorena is a 52 y.o. year old White male who has presents to be considered for advanced surgical options (LVAD/OHT).  He has Stage D ICMP w/ Class 3 HFrEF (6/20 EF 8% w/ FRENCH at < 1 block) and was started on Aldactone 25 mg po qd last time he saw us.  Since then he reports doing well but two weeks ago he developed orthostatic near-syncope.    Past Medical History:   Diagnosis Date    CHF (congestive heart failure)     Hyperlipidemia     Myocardial infarction      Past Surgical History:   Procedure Laterality Date    APPENDECTOMY         No family history on file.    Review of Systems   Constitution: Negative for chills and fever.   Cardiovascular: Positive for dyspnea on exertion, near-syncope (Orthostatic) and paroxysmal nocturnal dyspnea (2-3 times a week). Negative for chest pain, claudication, irregular heartbeat, leg swelling, orthopnea and syncope.   Respiratory: Negative for cough and shortness of breath.    Gastrointestinal: Negative for constipation, diarrhea, nausea and vomiting.       Objective:   Blood pressure 101/63, pulse 71, height 5' 5" (1.651 m), weight 56.8 kg (125 lb 3.5 oz).body mass index is 20.84 kg/m².    Physical Exam   Constitutional: He is oriented to person, place, and time. He appears well-developed and well-nourished. No distress.   Middle aged WM sitting on exam table in NAD   HENT:   Head: Normocephalic and atraumatic.   Eyes: EOM are normal. Pupils are equal, round, and reactive to light.   Neck: Normal range of motion. Neck supple. Hepatojugular reflux present. No JVD present.   Cardiovascular: Normal rate and regular rhythm.  Exam reveals no gallop, no S3, no friction rub and no decreased pulses.    Murmur heard.  Pulses:       Radial pulses are 2+ on the right side, and 2+ on the left side.        Dorsalis pedis pulses are 2+ on the right side, and 2+ on the left side.        Posterior tibial pulses " are 2+ on the right side, and 2+ on the left side.   2/6 crescendo - decrescendo murmur loudest at the apex   Pulmonary/Chest: No tachypnea. No respiratory distress. He has no decreased breath sounds. He has no wheezes. He has no rhonchi. He has no rales.   Life vest in place   Abdominal: Soft. Bowel sounds are normal. He exhibits no distension. There is no tenderness.   Musculoskeletal: He exhibits no edema or tenderness.   Neurological: He is alert and oriented to person, place, and time.       Labs:    Chemistry        Component Value Date/Time     06/20/2018 0730    K 3.6 06/20/2018 0730     06/20/2018 0730    CO2 27 06/20/2018 0730    BUN 12 06/20/2018 0730    CREATININE 0.9 06/20/2018 0730     06/20/2018 0730        Component Value Date/Time    CALCIUM 9.1 06/20/2018 0730    ALKPHOS 94 06/20/2018 0730    AST 17 06/20/2018 0730    ALT 16 06/20/2018 0730    BILITOT 0.2 06/20/2018 0730    ESTGFRAFRICA >60.0 06/20/2018 0730    EGFRNONAA >60.0 06/20/2018 0730          No results found for: MG  Lab Results   Component Value Date    WBC 7.76 06/06/2018    HGB 13.3 (L) 06/06/2018    HCT 41.3 06/06/2018    MCV 84 06/06/2018     (H) 06/06/2018     BNP   Date Value Ref Range Status   06/20/2018 723 (H) 0 - 99 pg/mL Final     Comment:     Values of less than 100 pg/ml are consistent with non-CHF populations.   06/06/2018 601 (H) 0 - 99 pg/mL Final     Comment:     Values of less than 100 pg/ml are consistent with non-CHF populations.   05/14/2018 853 (H) 0 - 99 pg/mL Final     Comment:     Values of less than 100 pg/ml are consistent with non-CHF populations.         Labs were reviewed with the patient.    Assessment:      1. Chronic combined systolic and diastolic heart failure    2. Ischemic cardiomyopathy        Plan:     1. HFrEF& 2. ICMP  -Check BMP today  -Ambulatory referral to EP  -RTC in one month    Patient is now NYHA III ACC stage D  Recommend 2 gram sodium restriction and 1500cc  fluid restriction.  Encourage physical activity with graded exercise program.  Requested patient to weigh themselves daily, and to notify us if their weight increases by more than 3 lbs in 1 day or 5 lbs in 1 week.        Transplant Candidacy: Patient is a 52 y.o. year old male with heart failure is being seen for possible LVAD and OHT. In my opinion, he is  a marginal LVAD and OHT candidate. Patient   did meet with MCS and/or pre-transplant coordinator at the end of this visit for workup. he is scheduled for risk stratification testing with CPX/RHC. The patient will follow up with pre-transplant.     UNOS Patient Status  Functional Status: 70% - Cares for self: unable to carry on normal activity or active work  Physical Capacity: Limited Mobility  Working for Income: No  If no, reason not working: Disability    Diego Hernandez MD           Agree with findings and plan

## 2018-08-17 DIAGNOSIS — I50.9 CONGESTIVE HEART FAILURE, UNSPECIFIED HF CHRONICITY, UNSPECIFIED HEART FAILURE TYPE: Primary | ICD-10-CM

## 2018-08-21 ENCOUNTER — HOSPITAL ENCOUNTER (OUTPATIENT)
Dept: CARDIOLOGY | Facility: CLINIC | Age: 53
Discharge: HOME | End: 2018-08-21
Payer: MEDICAID

## 2018-08-21 ENCOUNTER — TELEPHONE (OUTPATIENT)
Dept: ELECTROPHYSIOLOGY | Facility: CLINIC | Age: 53
End: 2018-08-21

## 2018-08-21 ENCOUNTER — INITIAL CONSULT (OUTPATIENT)
Dept: ELECTROPHYSIOLOGY | Facility: CLINIC | Age: 53
End: 2018-08-21
Payer: MEDICAID

## 2018-08-21 ENCOUNTER — OFFICE VISIT (OUTPATIENT)
Dept: TRANSPLANT | Facility: CLINIC | Age: 53
End: 2018-08-21
Payer: MEDICAID

## 2018-08-21 VITALS
BODY MASS INDEX: 20.32 KG/M2 | HEART RATE: 65 BPM | SYSTOLIC BLOOD PRESSURE: 102 MMHG | DIASTOLIC BLOOD PRESSURE: 50 MMHG | HEART RATE: 62 BPM | DIASTOLIC BLOOD PRESSURE: 60 MMHG | WEIGHT: 122.38 LBS | HEIGHT: 65 IN | WEIGHT: 121.94 LBS | HEIGHT: 65 IN | SYSTOLIC BLOOD PRESSURE: 95 MMHG | BODY MASS INDEX: 20.39 KG/M2

## 2018-08-21 DIAGNOSIS — I25.5 ISCHEMIC CARDIOMYOPATHY: Primary | ICD-10-CM

## 2018-08-21 DIAGNOSIS — I21.9 MYOCARDIAL INFARCTION, UNSPECIFIED MI TYPE, UNSPECIFIED ARTERY: ICD-10-CM

## 2018-08-21 DIAGNOSIS — I25.10 CORONARY ARTERY DISEASE INVOLVING NATIVE CORONARY ARTERY OF NATIVE HEART WITHOUT ANGINA PECTORIS: ICD-10-CM

## 2018-08-21 DIAGNOSIS — E78.2 MIXED HYPERLIPIDEMIA: ICD-10-CM

## 2018-08-21 DIAGNOSIS — I45.10 RBBB: ICD-10-CM

## 2018-08-21 DIAGNOSIS — I25.5 ISCHEMIC CARDIOMYOPATHY: ICD-10-CM

## 2018-08-21 DIAGNOSIS — F51.01 PRIMARY INSOMNIA: ICD-10-CM

## 2018-08-21 DIAGNOSIS — I50.9 CONGESTIVE HEART FAILURE, UNSPECIFIED HF CHRONICITY, UNSPECIFIED HEART FAILURE TYPE: ICD-10-CM

## 2018-08-21 DIAGNOSIS — Z95.820 S/P ANGIOPLASTY WITH STENT: ICD-10-CM

## 2018-08-21 DIAGNOSIS — I50.42 CHRONIC COMBINED SYSTOLIC AND DIASTOLIC HEART FAILURE: Primary | ICD-10-CM

## 2018-08-21 PROCEDURE — 99215 OFFICE O/P EST HI 40 MIN: CPT | Mod: S$PBB,NTX,, | Performed by: INTERNAL MEDICINE

## 2018-08-21 PROCEDURE — 99205 OFFICE O/P NEW HI 60 MIN: CPT | Mod: S$PBB,NTX,, | Performed by: INTERNAL MEDICINE

## 2018-08-21 PROCEDURE — 99999 PR PBB SHADOW E&M-EST. PATIENT-LVL III: CPT | Mod: PBBFAC,TXP,, | Performed by: INTERNAL MEDICINE

## 2018-08-21 PROCEDURE — 99213 OFFICE O/P EST LOW 20 MIN: CPT | Mod: PBBFAC,27,NTX,25 | Performed by: INTERNAL MEDICINE

## 2018-08-21 PROCEDURE — 93005 ELECTROCARDIOGRAM TRACING: CPT | Mod: PBBFAC,NTX | Performed by: INTERNAL MEDICINE

## 2018-08-21 PROCEDURE — 93010 ELECTROCARDIOGRAM REPORT: CPT | Mod: S$PBB,NTX,, | Performed by: INTERNAL MEDICINE

## 2018-08-21 PROCEDURE — 99213 OFFICE O/P EST LOW 20 MIN: CPT | Mod: PBBFAC,NTX | Performed by: INTERNAL MEDICINE

## 2018-08-21 RX ORDER — CARVEDILOL 3.12 MG/1
3.12 TABLET ORAL 2 TIMES DAILY
COMMUNITY
Start: 2018-07-24 | End: 2018-10-25 | Stop reason: SDUPTHER

## 2018-08-21 NOTE — TELEPHONE ENCOUNTER
Spoke to Mr. Castorena to confirm procedure date of 9/28 with arrival time of 1100.    Labs mailed to pt to bring to [facility] to have drawn on 9/24 at 0900.    Instructions reviewed with pt who verbalizes understanding and appreciates call.      Instructions & lab orders mailed.

## 2018-08-21 NOTE — PROGRESS NOTES
Subjective:    Patient ID:  Osei Castorena is a 52 y.o. male who presents for evaluation of Cardiomyopathy    Referring Cardiologist: Chad Broderick MD    HPI  I had the pleasure of seeing Mr. Castorena today in our electrophysiology clinic in consultation for his cardiomyopathy. As you are aware he is a pleasant 52 year-old  with myocardial infarction in 2017 s/p RCA PCI, hypertension, and severe ischemic cardiomyopathy with LVEF <10% with NYHA class III symptoms despite revascularization and max tolerated medical therapy with beta-blocker/lisinopril for greater than 3 months. He is wearing a lifevest. He has never been shocked. He has cancelled several alarms that he was told were from artifact. He continues to work as a . He notes no syncope. He does have occasional episodes of sustained palpitations.    ECHO 6/2018 CONCLUSIONS     1 - Eccentric LVH with severely depressed left ventricular systolic function (EF <10%).     2 - Severe left atrial enlargement.     3 - Impaired LV relaxation, normal LAP (grade 1 diastolic dysfunction).     4 - Right ventricle is upper limit of normal in size with normal systolic function.     5 - Mild to moderate tricuspid regurgitation.     6 - The estimated PA systolic pressure is 30 mmHg.     7 - Mild mitral regurgitation.     My interpretation of today's in clinic ECG is sinus rhythm with RBBB and qrs duration of 170msec.    Review of Systems   Constitution: Negative for weakness and malaise/fatigue.   HENT: Negative for congestion and sore throat.    Eyes: Negative for blurred vision and visual disturbance.   Cardiovascular: Positive for palpitations. Negative for chest pain, dyspnea on exertion, near-syncope and syncope.   Respiratory: Negative for cough and shortness of breath.    Hematologic/Lymphatic: Negative for bleeding problem. Does not bruise/bleed easily.   Skin: Negative.    Musculoskeletal: Negative.    Gastrointestinal: Negative for nausea and vomiting.    Neurological: Negative for dizziness and focal weakness.        Objective:    Physical Exam   Constitutional: He is oriented to person, place, and time. He appears well-developed and well-nourished. No distress.   HENT:   Head: Normocephalic and atraumatic.   Eyes: Conjunctivae are normal. Right eye exhibits no discharge. Left eye exhibits no discharge.   Neck: Neck supple. No JVD present.   Cardiovascular: Normal rate and regular rhythm. Exam reveals no gallop and no friction rub.   No murmur heard.  Pulmonary/Chest: Effort normal and breath sounds normal. No respiratory distress. He has no wheezes. He has no rales.   Abdominal: Soft. Bowel sounds are normal. He exhibits no distension. There is no tenderness. There is no rebound.   Musculoskeletal: He exhibits no edema.   Neurological: He is alert and oriented to person, place, and time.   Skin: Skin is warm and dry. He is not diaphoretic.   Psychiatric: He has a normal mood and affect. His behavior is normal. Thought content normal.   Vitals reviewed.        Assessment:       1. Ischemic cardiomyopathy    2. Coronary artery disease involving native coronary artery of native heart without angina pectoris    3. S/P angioplasty with stent    4. Myocardial infarction, unspecified MI type, unspecified artery    5. RBBB         Plan:       In summary, Mr. Castorena is a pleasant 52 year-old  with myocardial infarction in 2017 s/p RCA PCI, hypertension, and severe ischemic cardiomyopathy with LVEF <10% with NYHA class III symptoms despite revascularization and max tolerated medical therapy with beta-blocker/lisinopril for greater than 3 months, and wide right bundle branch block. We discussed the etiology/pathophysiology of sudden cardiac death in a patient population such as his and how an ICD can provide mortality benefit. We also discussed the long-term risks of device related infection, device/wire failure, and inappropriate ICD therapies including shocks.  Technically he meets guideline indication for CRT (class IIa, ischemic EF <35%, NYHA class III, non-LBBB >150ms) however discussed he is in the lower likelihood of responding group. Discussed dcICD versus CRT-D. Since he has symptomatic CHF without improvement in other therapies it is reasonable to implant a CRT-D and give a trial of CRT. We discussed the alternatives, benefits and risks of the procedure including pain, infection, bleeding, injury to lung causing pneumothorax requiring tube placement, injury to heart valves, puncture of the heart leading to pericardial effusion or tamponade requiring tube drainage, heart attack, stroke and death. He understood and desires to proceed. Consent signed.    Plan  CRT-D, [facility]  Anesthesia for sedation    Thank you for allowing me to participate in the care of this patient. Please do not hesitate to call me with any questions or concerns.    Daniel Chand MD, PhD  Cardiac Electrophysiology

## 2018-08-21 NOTE — PROGRESS NOTES
IMPLANTABLE DEVICE EDUCATION CHECKLIST    PRE PROCEDURE TESTING     Monday Sept 24 at 0900   Pre-procedure labs have been ordered for you.  Please take attached orders to [facility] to have drawn.  YOU DO NOT HAVE TO FAST FOR THIS LAB WORK!      DAY OF PROCEDURE    Friday September 28th at 11:00 a.m.    Report to Cardiology Waiting Room on the 3rd floor of the Hospital  · Wash your chest with HIBICLENS OR AN ANTIBACTERIAL SOAP (such as Dial) on the night before and the morning of your procedure.  · Do not eat or drink anything after 12 mn on the night before your procedure    Medications  · You may take your other usual morning medications with a sip of water.    Directions to Cardiology Waiting Room  If you park in the Parking Garage:  Take elevators to the 2nd floor  Walk up ramp and turn right by Gold Elevators  Take elevator to the 3rd floor  Upon exiting the elevator, turn away from the clinic areas  Walk long pratt around to front of hospital to area with windows overlooking [facility]  Check in at Reception Desk  OR  If family is dropping you off:  Have them drop you off at the front of the Hospital  (Near the ER, where all the flags are hung).  Take the E elevators to the 3rd floor.  Check in at the Reception Desk in the waiting room.      · YOU WILL BE SPENDING THE NIGHT AFTER YOUR PROCEDURE  · YOU WILL NEED SOMEONE TO DRIVE YOU HOME THE DAY AFTER YOUR PROCEDURE  · YOUR PAIN DURING YOUR PROCEDURE WILL BE MANAGED BY THE ANESTHESIA TEAM    Any need to reschedule or cancel procedures, or any questions regarding your procedures should be addressed directly with the Arrhythmia Department Nurses at the following phone number: [phone]

## 2018-08-21 NOTE — PATIENT INSTRUCTIONS
Implantable Cardioverter Defibrillator (ICD)  An ICD is a device that is placed permanently inside your body. It monitors your heart rhythm (the speed and pattern of your heartbeat). If this rhythm becomes too fast and potentially dangerous, the ICD sends out electrical signals, either rapid pacing or an electric shock, that help bring the rhythm back to normal. The ICD is put inside your body during a minor surgical procedure called implantation. In most cases, implantation takes about an hour.  How do I get ready for this procedure?  · Dont eat or drink anything after midnight the night before the procedure, or 8 hours before the procedure.  · Follow your healthcare provider's instructions on what medicines to take.  · You may be asked to shower with antibacterial soap the night before your procedure and the morning of the procedure. Ask your provider if he or she wants you to use a certain kind of soap.  · Your provider may ask you to use clean bed sheets and pajamas the night before the procedure.  What happens during the procedure?    The ICD is usually put on the left side of your chest. Putting the ICD in your body (implantation) does not need open heart surgery. This means your chest will not be opened. During the procedure:  · You will be given medicine to help you relax.  · The doctor makes a cut (incision) in the skin below your collarbone. This creates a pocket to hold the ICD.  · The doctor threads a wire (lead) through the incision into a vein in the upper chest. With the help of X-ray monitors, the doctor guides the lead into one of the hearts chambers. Depending on how many leads your ICD has, this process may be repeated to guide leads into other chambers.  · The doctor attaches the leads to the heart muscle so they will stay in place. The leads will be tested to make sure they are placed correctly.  · The battery (generator) is attached to the leads. Then the doctor places the generator in  its pocket under the skin.  · The doctor may start (induce) a fast heart rhythm to test the ICD.  · Antibiotic solution is used to clean the pocket.  · When everything else is done, the incision is closed with sutures that dissolve, medical glue, or staples.  Other implantation sites  In some cases, the ICD can be put elsewhere in the body. This could be on the right side of the chest, or on the left side under the muscle. If one of these is an option for you, your doctor will explain more.  What happens after the procedure?  You will stay in the hospital overnight. While you are in the hospital, your hearts signals are monitored to see how the ICD is working. You can go home when your condition is stable. Once you get home:  · Follow your discharge instructions to care for your incision. Watch for signs of infection (see box).  · Follow any special instructions to care for the side of your body where your ICD was implanted. Your doctor may tell you not to raise that arm above the shoulder for a certain amount of time.  · Youll likely have bruising at the incision site. This is normal and will go away as the incision heals.  · You can probably return to your normal routine soon after implantation. Ask your doctor when you can return to work.  · You may be instructed not to drive for a certain amount of time. Generally, you should not drive for about 6 months after having an ICD implanted, or if the device delivers a shock. Commercial driving with an ICD is not allowed by most state laws because of the risk of losing consciousness from life threatening heart rhythms that this device is designed to treat.  · See your doctor for follow-up visits as recommended.     Call 911  Call 911 if you have:  · Chest pain  · Severe shortness of breath  When should I call my healthcare provider?  Contact your healthcare team if you have any of the following:  · Signs of an infection, such as: fever over 100.4ºF (38ºC), drainage  "from the incision, or redness, swelling, or warmth at the incision site  · Twitching chest muscles  · Pain around your ICD that gets worse, not better  · Bleeding from your incision  · Swelling in the arm on the side of the incision site  · Severe swelling of the incision site (bulging up like a golf ball)  · You receive a shock from your ICD  · Your device generator feels loose under the skin or wiggles in the pocket   Date Last Reviewed: 3/30/2016  © 6067-9817 [employer]. [address] [place] [address]. All rights reserved. This information is not intended as a substitute for professional medical care. Always follow your healthcare professional's instructions.        Living with an Implantable Cardioverter Defibrillator (ICD)  Your ICD is a device that monitors the electrical signals in your heart. Most ICDs are well protected from other electrical device interference. Microwave ovens and most common household and yard appliances will not cause problems. Signals from some large electric or magnetic fields can make interference "noise" on your ICD. This can cause problems. Possible sources of interference include certain heavy equipment, strong magnets, running motors, and large tools like commercial arc welders. You shouldn't work on your car with the motor running, but it's safe to drive. Check with your doctor about any large, unusual power tools you use.        Cell phones dont usually cause problems, but don't carry your phone in your shirt pocket right over the ICD.   Signals that cause problems  To protect your ICD, take special precautions around:  · Cell phones. Always carry a cell phone on the side opposite your ICD and at least 6 inches away from it. While using a cell phone, wear a headset or hold the phone to the ear opposite your ICD.  · Electromagnetic anti-theft systems. These are often near entrances or exits in stores. Walking through one is OK, but avoid standing near " or leaning against one.  · Strong electrical fields. These can be caused by radio transmitting towers and heavy-duty electrical equipment (such as arc welders). A running engine also produces an electrical field. Its OK to ride in a car, but avoid leaning over the open gonzalez of a running car.  · Very strong magnets. Talk with your heart doctor if another doctor tells you to have an MRI (a medical test that uses magnets). Some ICD devices are considered safe for having an MRI (called MR-conditional devices), but safety precautions must still be used. Magnets in big speakers (such as on a stereo or at a concert) and in hand-held security wands (such as those used at airports) can cause problems if they come too close to the ICD.  If a signal interferes  If its near one of the signals described above, the ICD could turn off or its settings could reset. You could even get a shock. If you think you were exposed to a signal like this, call your doctor and explain what happened.  Carry an ID card  Youll be given a temporary ID card when you get your ICD. The permanent card will be mailed to you in about 6 weeks. Show this card to any doctor, dentist, or other medical professional you visit. Also show it to guards at the airport. This way, they know to follow special procedures that prevent the security wand from interfering with your ICD.  Driving safety with an ICD  ICD devices are implanted to shock the heart out of a life threatening heart rhythm. These heart rhythms can cause loss of consciousness (syncope). Your healthcare provider will give you directions on if and when it's safe to drive after you have had once of these devices implanted. Generally, you should not drive for 6 months after you have had this device implanted, or after the device has delivered a shock. You should never drive for commercial purposes after you have an ICD implanted. This is often restricted by state laws because of the high risk of  passing out and the dangers that poses while driving.  Follow up  Plan on having periodic checkups with your healthcare provider to evaluate the battery life of your ICD. Depending on your device and how much your body uses the pacing functions of the ICD, you will need a new device generator implanted at some point, usually about every 5 to 7 years. On average, this monitoring should happen every 6 months, or as advised by your healthcare provider.  For some devices, the monitoring of the device function and battery life can be done with a remote monitor that can be set up in your home. Remote monitoring systems use the internet or telephone to communicate the information from your device to your healthcare provider.  Date Last Reviewed: 5/1/2016  © 9819-7641 [employer]. [address] [place] [address], [place] [address]. All rights reserved. This information is not intended as a substitute for professional medical care. Always follow your healthcare professional's instructions.

## 2018-08-21 NOTE — PROGRESS NOTES
"Subjective:   Subsequent evaluation of heart transplant candidacy.    HPI:  Mr. Castorena is a 52 y.o. year old White male who has presents to be considered for advanced surgical options (LVAD/OHT).  He has Stage D ICMP w/ Class 3 HFrEF (6/20 EF 8% w/ FRENCH at < 1 block) and was started on Aldactone 25 mg po qd last time he saw us.  FRENCH FC II NYHA. He is going to have an ICD    Past Medical History:   Diagnosis Date    CHF (congestive heart failure)     Hyperlipidemia     Myocardial infarction      Past Surgical History:   Procedure Laterality Date    APPENDECTOMY         History reviewed. No pertinent family history.    Review of Systems   Constitution: Negative for chills and fever.   Cardiovascular: Positive for dyspnea on exertion. Negative for chest pain, claudication, irregular heartbeat, leg swelling, near-syncope, orthopnea, paroxysmal nocturnal dyspnea and syncope.   Respiratory: Negative for cough and shortness of breath.    Gastrointestinal: Negative for constipation, diarrhea, nausea and vomiting.       Objective:   Blood pressure (!) 95/50, pulse 65, height 5' 5" (1.651 m), weight 55.3 kg (121 lb 14.6 oz).body mass index is 20.29 kg/m².    Physical Exam   Constitutional: He is oriented to person, place, and time. He appears well-developed and well-nourished. No distress.   Middle aged WM sitting on exam table in NAD   HENT:   Head: Normocephalic and atraumatic.   Eyes: EOM are normal. Pupils are equal, round, and reactive to light.   Neck: Normal range of motion. Neck supple. Hepatojugular reflux present. No JVD present.   Cardiovascular: Normal rate and regular rhythm. Exam reveals no gallop, no S3, no friction rub and no decreased pulses.   Murmur heard.  Pulses:       Radial pulses are 2+ on the right side, and 2+ on the left side.        Dorsalis pedis pulses are 2+ on the right side, and 2+ on the left side.        Posterior tibial pulses are 2+ on the right side, and 2+ on the left side.   2/6 " crescendo - decrescendo murmur loudest at the apex   Pulmonary/Chest: No tachypnea. No respiratory distress. He has no decreased breath sounds. He has no wheezes. He has no rhonchi. He has no rales.   Life vest in place   Abdominal: Soft. Bowel sounds are normal. He exhibits no distension. There is no tenderness.   Musculoskeletal: He exhibits no edema or tenderness.   Neurological: He is alert and oriented to person, place, and time.       Labs:    Chemistry        Component Value Date/Time     07/23/2018 1350    K 4.0 07/23/2018 1350     07/23/2018 1350    CO2 28 07/23/2018 1350    BUN 4 (L) 07/23/2018 1350    CREATININE 0.8 07/23/2018 1350    GLU 76 07/23/2018 1350        Component Value Date/Time    CALCIUM 9.4 07/23/2018 1350    ALKPHOS 94 06/20/2018 0730    AST 17 06/20/2018 0730    ALT 16 06/20/2018 0730    BILITOT 0.2 06/20/2018 0730    ESTGFRAFRICA >60.0 07/23/2018 1350    EGFRNONAA >60.0 07/23/2018 1350          No results found for: MG  Lab Results   Component Value Date    WBC 7.76 06/06/2018    HGB 13.3 (L) 06/06/2018    HCT 41.3 06/06/2018    MCV 84 06/06/2018     (H) 06/06/2018     BNP   Date Value Ref Range Status   06/20/2018 723 (H) 0 - 99 pg/mL Final     Comment:     Values of less than 100 pg/ml are consistent with non-CHF populations.   06/06/2018 601 (H) 0 - 99 pg/mL Final     Comment:     Values of less than 100 pg/ml are consistent with non-CHF populations.   05/14/2018 853 (H) 0 - 99 pg/mL Final     Comment:     Values of less than 100 pg/ml are consistent with non-CHF populations.         Labs were reviewed with the patient.    Assessment:      1. Chronic combined systolic and diastolic heart failure    2. Coronary artery disease involving native coronary artery of native heart without angina pectoris    3. Mixed hyperlipidemia    4. Ischemic cardiomyopathy    5. S/P angioplasty with stent    6. Primary insomnia        Plan:     1. HFrEF& 2. ICMP  -Check BMP  today  -ICD  -RTC in 1 months  Patient is now NYHA III ACC stage D  Recommend 2 gram sodium restriction and 1500cc fluid restriction.  Encourage physical activity with graded exercise program.  Requested patient to weigh themselves daily, and to notify us if their weight increases by more than 3 lbs in 1 day or 5 lbs in 1 week.        Transplant Candidacy: Patient is a 52 y.o. year old male with heart failure is being seen for possible LVAD and OHT. In my opinion, he is  a marginal LVAD and OHT candidate. Patient   did meet with [facility]CS and/or pre-transplant coordinator at the end of this visit for workup. he is scheduled for risk stratification testing with CPX/RHC. The patient will follow up with pre-transplant.     UNOS Patient Status  Functional Status: 70% - Cares for self: unable to carry on normal activity or active work  Physical Capacity: Limited Mobility  Working for Income: No  If no, reason not working: Disability    Chad Broderick MD

## 2018-08-21 NOTE — LETTER
August 21, 2018        Ricky Gannon  [address] [facility]  [address]  [place] [address]  Phone: [phone]  Fax: [phone]             [facility] [address]  [facility] [address]  Phone: [phone]   Patient: Osei Castorena   MR Number: [id number]   YOB: 1965   Date of Visit: 8/21/2018       Dear Dr. Ricky Gannon    Thank you for referring Osei Castorena to me for evaluation. Attached you will find relevant portions of my assessment and plan of care.    If you have questions, please do not hesitate to call me. I look forward to following Osei Castorena along with you.    Sincerely,    Chad Broderick MD    Enclosure    If you would like to receive this communication electronically, please contact [email or web address] or [phone] to request [employer] access.    [employer] Link is a tool which provides read-only access to select patient information with whom you have a relationship. Its easy to use and provides real time access to review your patients record including encounter summaries, notes, results, and demographic information.    If you feel you have received this communication in error or would no longer like to receive these types of communications, please e-mail [email or web address]

## 2018-08-21 NOTE — LETTER
August 21, 2018      Chad Broderick MD  [address]  [facility] [address]           [facility] - Arrhythmia  [address]  [facility] [address]  Phone: [phone]  Fax: [phone]          Patient: Osei Castorena   MR Number: [id number]   YOB: 1965   Date of Visit: 8/21/2018       Dear Dr. Chad Broderick:    Thank you for referring Osei Castorena to me for evaluation. Attached you will find relevant portions of my assessment and plan of care.    If you have questions, please do not hesitate to call me. I look forward to following Osei Castorena along with you.    Sincerely,    Daniel Chand MD    Enclosure  CC:  No Recipients    If you would like to receive this communication electronically, please contact [email or web address] or [phone] to request more information on [employer] access.    For providers and/or their staff who would like to refer a patient to [facility], please contact us through our one-stop-shop provider referral line, [facility], at [phone].    If you feel you have received this communication in error or would no longer like to receive these types of communications, please e-mail [email or web address]

## 2018-09-11 ENCOUNTER — OFFICE VISIT (OUTPATIENT)
Dept: TRANSPLANT | Facility: CLINIC | Age: 53
End: 2018-09-11
Payer: MEDICAID

## 2018-09-11 ENCOUNTER — SOCIAL WORK (OUTPATIENT)
Dept: TRANSPLANT | Facility: CLINIC | Age: 53
End: 2018-09-11

## 2018-09-11 VITALS
HEIGHT: 65 IN | HEART RATE: 67 BPM | OXYGEN SATURATION: 98 % | WEIGHT: 116.88 LBS | SYSTOLIC BLOOD PRESSURE: 89 MMHG | BODY MASS INDEX: 19.47 KG/M2 | DIASTOLIC BLOOD PRESSURE: 55 MMHG

## 2018-09-11 DIAGNOSIS — I50.42 CHRONIC COMBINED SYSTOLIC AND DIASTOLIC HEART FAILURE: Primary | ICD-10-CM

## 2018-09-11 DIAGNOSIS — I25.10 CORONARY ARTERY DISEASE INVOLVING NATIVE CORONARY ARTERY OF NATIVE HEART WITHOUT ANGINA PECTORIS: ICD-10-CM

## 2018-09-11 DIAGNOSIS — F51.01 PRIMARY INSOMNIA: ICD-10-CM

## 2018-09-11 DIAGNOSIS — Z95.820 S/P ANGIOPLASTY WITH STENT: ICD-10-CM

## 2018-09-11 DIAGNOSIS — I45.10 RBBB: ICD-10-CM

## 2018-09-11 PROCEDURE — 99215 OFFICE O/P EST HI 40 MIN: CPT | Mod: S$PBB,NTX,, | Performed by: INTERNAL MEDICINE

## 2018-09-11 PROCEDURE — 99213 OFFICE O/P EST LOW 20 MIN: CPT | Mod: PBBFAC,TXP | Performed by: INTERNAL MEDICINE

## 2018-09-11 PROCEDURE — 99999 PR PBB SHADOW E&M-EST. PATIENT-LVL III: CPT | Mod: PBBFAC,TXP,, | Performed by: INTERNAL MEDICINE

## 2018-09-11 NOTE — PROGRESS NOTES
VERONA called pt at Dr. Broderick's request. Pt reports he is scheduled to have an ICD placed soon, and will be out of work for 6 weeks after the procedure. Pt requesting SW assist with disability application. SW explained pt will need to apply for disability through his local disability office. Pt reports he already applied through the disability office and was denied. Pt reports he has spoken to a , who told the pt he will not qualify for disability at this time because pt is still working full time. SW suggested pt speak with his employer's HR departments about short term disability, and pt stated this is not a benefit he has.     Pt has Medicaid for insurance, and SW suggested pt call Medicaid to apply for mileage reimbursement. Pt reports plan to do the same.     Pt reports no other questions or concerns at this time. SW providing ongoing psychosocial and counseling support, education, assistance, resources, and discharge planning as indicated. SW continuing to follow and remains available.

## 2018-09-11 NOTE — PROGRESS NOTES
"Subjective:   Subsequent evaluation of heart transplant candidacy.    HPI:  Mr. Castorena is a 53 y.o. year old White male who has presents to be considered for advanced surgical options (LVAD/OHT).  He has Stage D ICMP w/ Class 3 HFrEF (6/20 EF 8% w/ FRENCH at < 1 block) and was started on Aldactone 25 mg po qd last time he saw us.  FRENCH FC II NYHA. He is going to have an ICD on the 28th september    Past Medical History:   Diagnosis Date    CHF (congestive heart failure)     Hyperlipidemia     Myocardial infarction      Past Surgical History:   Procedure Laterality Date    APPENDECTOMY      HEART CATH-RIGHT Right 5/22/2018    Performed by Tunde Hernadez MD at [facility] CATH LAB       No family history on file.    Review of Systems   Constitution: Negative for chills and fever.   Cardiovascular: Positive for dyspnea on exertion. Negative for chest pain, claudication, irregular heartbeat, leg swelling, near-syncope, orthopnea, paroxysmal nocturnal dyspnea and syncope.   Respiratory: Negative for cough and shortness of breath.    Gastrointestinal: Negative for constipation, diarrhea, nausea and vomiting.       Objective:   Blood pressure (!) 89/55, pulse 67, height 5' 5" (1.651 m), weight 53 kg (116 lb 13.5 oz), SpO2 98 %.body mass index is 19.44 kg/m².    Physical Exam   Constitutional: He is oriented to person, place, and time. He appears well-developed and well-nourished. No distress.   Middle aged WM sitting on exam table in NAD   HENT:   Head: Normocephalic and atraumatic.   Eyes: EOM are normal. Pupils are equal, round, and reactive to light.   Neck: Normal range of motion. Neck supple. Hepatojugular reflux present. No JVD present.   Cardiovascular: Normal rate and regular rhythm. Exam reveals no gallop, no S3, no friction rub and no decreased pulses.   Murmur heard.  Pulses:       Radial pulses are 2+ on the right side, and 2+ on the left side.        Dorsalis pedis pulses are 2+ on the right side, and 2+ on the " left side.        Posterior tibial pulses are 2+ on the right side, and 2+ on the left side.   2/6 crescendo - decrescendo murmur loudest at the apex   Pulmonary/Chest: No tachypnea. No respiratory distress. He has no decreased breath sounds. He has no wheezes. He has no rhonchi. He has no rales.   Life vest in place   Abdominal: Soft. Bowel sounds are normal. He exhibits no distension. There is no tenderness.   Musculoskeletal: He exhibits no edema or tenderness.   Neurological: He is alert and oriented to person, place, and time.       Labs:    Chemistry        Component Value Date/Time     07/23/2018 1350    K 4.0 07/23/2018 1350     07/23/2018 1350    CO2 28 07/23/2018 1350    BUN 4 (L) 07/23/2018 1350    CREATININE 0.8 07/23/2018 1350    GLU 76 07/23/2018 1350        Component Value Date/Time    CALCIUM 9.4 07/23/2018 1350    ALKPHOS 94 06/20/2018 0730    AST 17 06/20/2018 0730    ALT 16 06/20/2018 0730    BILITOT 0.2 06/20/2018 0730    ESTGFRAFRICA >60.0 07/23/2018 1350    EGFRNONAA >60.0 07/23/2018 1350          No results found for: MG  Lab Results   Component Value Date    WBC 7.76 06/06/2018    HGB 13.3 (L) 06/06/2018    HCT 41.3 06/06/2018    MCV 84 06/06/2018     (H) 06/06/2018     BNP   Date Value Ref Range Status   06/20/2018 723 (H) 0 - 99 pg/mL Final     Comment:     Values of less than 100 pg/ml are consistent with non-CHF populations.   06/06/2018 601 (H) 0 - 99 pg/mL Final     Comment:     Values of less than 100 pg/ml are consistent with non-CHF populations.   05/14/2018 853 (H) 0 - 99 pg/mL Final     Comment:     Values of less than 100 pg/ml are consistent with non-CHF populations.         Labs were reviewed with the patient.    Assessment:      1. Chronic combined systolic and diastolic heart failure    2. Coronary artery disease involving native coronary artery of native heart without angina pectoris    3. S/P angioplasty with stent    4. RBBB    5. Primary insomnia         Plan:     1. HFrEF& 2. ICMP  -Check BMP today  -ICD  -RTC in 1 months please schedule with  next visit    Patient is now NYHA III ACC stage D  Recommend 2 gram sodium restriction and 1500cc fluid restriction.  Encourage physical activity with graded exercise program.  Requested patient to weigh themselves daily, and to notify us if their weight increases by more than 3 lbs in 1 day or 5 lbs in 1 week.        Transplant Candidacy: Patient is a 52 y.o. year old male with heart failure is being seen for possible LVAD and OHT. In my opinion, he is  a marginal LVAD and OHT candidate. Patient   did meet with MCS and/or pre-transplant coordinator at the end of this visit for workup. he is scheduled for risk stratification testing with CPX/RHC. The patient will follow up with pre-transplant.     UNOS Patient Status  Functional Status: 70% - Cares for self: unable to carry on normal activity or active work  Physical Capacity: Limited Mobility  Working for Income: No  If no, reason not working: Disability    Chad Broderick MD

## 2018-09-11 NOTE — LETTER
September 11, 2018        Ricky Gannon  [address]  [address]  [place] [address]  Phone: [phone]  Fax: [phone]             [facility] [address]  [facility] [address]  Phone: [phone]   Patient: Osei Castorena   MR Number: [id number]   YOB: 1965   Date of Visit: 9/11/2018       Dear Dr. Ricky Gannon    Thank you for referring Osei Castorena to me for evaluation. Attached you will find relevant portions of my assessment and plan of care.    If you have questions, please do not hesitate to call me. I look forward to following Osei Castorena along with you.    Sincerely,    Chad Broderick MD    Enclosure    If you would like to receive this communication electronically, please contact [email or web address] or [phone] to request [employer] access.    [employer] is a tool which provides read-only access to select patient information with whom you have a relationship. Its easy to use and provides real time access to review your patients record including encounter summaries, notes, results, and demographic information.    If you feel you have received this communication in error or would no longer like to receive these types of communications, please e-mail [email or web address]

## 2018-09-24 ENCOUNTER — TELEPHONE (OUTPATIENT)
Dept: ELECTROPHYSIOLOGY | Facility: CLINIC | Age: 53
End: 2018-09-24

## 2018-09-24 NOTE — TELEPHONE ENCOUNTER
Attempting to reach pt to discuss possibility of moving procedure to 10/22.  No answer x 2.  Unable to leave VM.  Will attempt again tomorrow.

## 2018-09-26 ENCOUNTER — TELEPHONE (OUTPATIENT)
Dept: ELECTROPHYSIOLOGY | Facility: CLINIC | Age: 53
End: 2018-09-26

## 2018-09-27 ENCOUNTER — TELEPHONE (OUTPATIENT)
Dept: ELECTROPHYSIOLOGY | Facility: CLINIC | Age: 53
End: 2018-09-27

## 2018-09-27 NOTE — TELEPHONE ENCOUNTER
"Spoke to Mr. Castorena    CONFIRMED procedure arrival time of 1100  Reiterated instructions including:  -Directions to check in desk  -NPO after midnight night prior to procedure  -Bathe night prior and morning prior to procedure with Hibiclens solution or an antibacterial soap    While confirming procedure, Mr. Castorena asked if he would have a  meet with him prior to leaving hospital to help with setting up disability.  He states he is a  and his company does not offer STD.  He states he will "be on the streets" if he is unable to work.  Let Mr. Castorena know I will discuss with Dr. Chand and call him back.    Dr. Chand called and spoke to Mr. Castorena personally.  He reiterated to him he would not be able to work x 6 weeks after procedure and that our office will call to find out process for disability to see if there was anything we could do to assist him.      Called case management and disability offices who both state they do not assist with state disability and that pt would need to contact social security office.      Called Mr. Castorena back to let him know this, and offered to cancel/re-schedule procedure to later date so that he has time to get all of his financial concerns addressed.  Mr. Castorena does not wish to cancel/re-schedule procedure and wishes to proceed with procedure tomorrow.      Mr. Castorena verbalizes full understanding he will not be able to do juliann work x 6 weeks and that hospital will not be setting up disability for him at discharge.     Let Mr. Castorena know to call office back with any additional questions/concerns.  He verbalizes understanding and appreciates call.             "

## 2018-09-28 ENCOUNTER — ANESTHESIA EVENT (OUTPATIENT)
Dept: MEDSURG UNIT | Facility: HOSPITAL | Age: 53
End: 2018-09-28
Payer: MEDICAID

## 2018-09-28 ENCOUNTER — ANESTHESIA (OUTPATIENT)
Dept: MEDSURG UNIT | Facility: HOSPITAL | Age: 53
End: 2018-09-28
Payer: MEDICAID

## 2018-09-28 ENCOUNTER — HOSPITAL ENCOUNTER (OUTPATIENT)
Facility: HOSPITAL | Age: 53
Discharge: HOME | End: 2018-09-29
Attending: INTERNAL MEDICINE | Admitting: INTERNAL MEDICINE
Payer: MEDICAID

## 2018-09-28 DIAGNOSIS — I49.9 ARRHYTHMIA: ICD-10-CM

## 2018-09-28 DIAGNOSIS — I25.5 ISCHEMIC CARDIOMYOPATHY: Primary | ICD-10-CM

## 2018-09-28 DIAGNOSIS — I50.42 CHRONIC COMBINED SYSTOLIC AND DIASTOLIC HEART FAILURE: ICD-10-CM

## 2018-09-28 DIAGNOSIS — E78.5 HYPERLIPIDEMIA: ICD-10-CM

## 2018-09-28 LAB
ANION GAP SERPL CALC-SCNC: 7 MMOL/L
APTT BLDCRRT: 23.9 SEC
BASOPHILS # BLD AUTO: 0.06 K/UL
BASOPHILS NFR BLD: 0.9 %
BUN SERPL-MCNC: 8 MG/DL
CALCIUM SERPL-MCNC: 9.1 MG/DL
CHLORIDE SERPL-SCNC: 108 MMOL/L
CO2 SERPL-SCNC: 26 MMOL/L
CREAT SERPL-MCNC: 0.8 MG/DL
DIFFERENTIAL METHOD: ABNORMAL
EOSINOPHIL # BLD AUTO: 0.3 K/UL
EOSINOPHIL NFR BLD: 4.5 %
ERYTHROCYTE [DISTWIDTH] IN BLOOD BY AUTOMATED COUNT: 14.2 %
EST. GFR  (AFRICAN AMERICAN): >60 ML/MIN/1.73 M^2
EST. GFR  (NON AFRICAN AMERICAN): >60 ML/MIN/1.73 M^2
GLUCOSE SERPL-MCNC: 90 MG/DL
HCT VFR BLD AUTO: 39 %
HGB BLD-MCNC: 12.8 G/DL
IMM GRANULOCYTES # BLD AUTO: 0.02 K/UL
IMM GRANULOCYTES NFR BLD AUTO: 0.3 %
INR PPP: 1
LYMPHOCYTES # BLD AUTO: 1.4 K/UL
LYMPHOCYTES NFR BLD: 21.5 %
MCH RBC QN AUTO: 29.7 PG
MCHC RBC AUTO-ENTMCNC: 32.8 G/DL
MCV RBC AUTO: 91 FL
MONOCYTES # BLD AUTO: 0.5 K/UL
MONOCYTES NFR BLD: 7.6 %
NEUTROPHILS # BLD AUTO: 4.2 K/UL
NEUTROPHILS NFR BLD: 65.2 %
NRBC BLD-RTO: 0 /100 WBC
PLATELET # BLD AUTO: 334 K/UL
PMV BLD AUTO: 8.4 FL
POTASSIUM SERPL-SCNC: 3.6 MMOL/L
PROTHROMBIN TIME: 10.2 SEC
RBC # BLD AUTO: 4.31 M/UL
SODIUM SERPL-SCNC: 141 MMOL/L
WBC # BLD AUTO: 6.42 K/UL

## 2018-09-28 PROCEDURE — D9220A PRA ANESTHESIA: Mod: CRNA,NTX,, | Performed by: NURSE ANESTHETIST, CERTIFIED REGISTERED

## 2018-09-28 PROCEDURE — 25000003 PHARM REV CODE 250: Mod: NTX | Performed by: NURSE ANESTHETIST, CERTIFIED REGISTERED

## 2018-09-28 PROCEDURE — 63600175 PHARM REV CODE 636 W HCPCS: Mod: NTX | Performed by: NURSE ANESTHETIST, CERTIFIED REGISTERED

## 2018-09-28 PROCEDURE — 93005 ELECTROCARDIOGRAM TRACING: CPT | Mod: 59,NTX

## 2018-09-28 PROCEDURE — 93010 ELECTROCARDIOGRAM REPORT: CPT | Mod: NTX,,, | Performed by: INTERNAL MEDICINE

## 2018-09-28 PROCEDURE — 63600175 PHARM REV CODE 636 W HCPCS: Mod: NTX

## 2018-09-28 PROCEDURE — 37000008 HC ANESTHESIA 1ST 15 MINUTES: Mod: NTX | Performed by: INTERNAL MEDICINE

## 2018-09-28 PROCEDURE — 25500020 PHARM REV CODE 255: Mod: NTX

## 2018-09-28 PROCEDURE — 85730 THROMBOPLASTIN TIME PARTIAL: CPT | Mod: NTX

## 2018-09-28 PROCEDURE — 63600175 PHARM REV CODE 636 W HCPCS: Mod: TXP | Performed by: STUDENT IN AN ORGANIZED HEALTH CARE EDUCATION/TRAINING PROGRAM

## 2018-09-28 PROCEDURE — S0020 INJECTION, BUPIVICAINE HYDRO: HCPCS | Mod: NTX

## 2018-09-28 PROCEDURE — 85025 COMPLETE CBC W/AUTO DIFF WBC: CPT | Mod: NTX

## 2018-09-28 PROCEDURE — D9220A PRA ANESTHESIA: Mod: ANES,NTX,, | Performed by: ANESTHESIOLOGY

## 2018-09-28 PROCEDURE — 25000003 PHARM REV CODE 250: Mod: TXP | Performed by: NURSE PRACTITIONER

## 2018-09-28 PROCEDURE — 27000221 HC OXYGEN, UP TO 24 HOURS: Mod: NTX

## 2018-09-28 PROCEDURE — 63600175 PHARM REV CODE 636 W HCPCS: Mod: NTX | Performed by: NURSE PRACTITIONER

## 2018-09-28 PROCEDURE — 00534 ANES INSERTION/RPLCMT CVDFB: CPT | Mod: NTX | Performed by: INTERNAL MEDICINE

## 2018-09-28 PROCEDURE — 80048 BASIC METABOLIC PNL TOTAL CA: CPT | Mod: TXP

## 2018-09-28 PROCEDURE — A4216 STERILE WATER/SALINE, 10 ML: HCPCS | Mod: TXP | Performed by: NURSE ANESTHETIST, CERTIFIED REGISTERED

## 2018-09-28 PROCEDURE — 25500020 PHARM REV CODE 255: Mod: TXP | Performed by: NURSE ANESTHETIST, CERTIFIED REGISTERED

## 2018-09-28 PROCEDURE — 33225 L VENTRIC PACING LEAD ADD-ON: CPT | Mod: Q0,NTX,, | Performed by: INTERNAL MEDICINE

## 2018-09-28 PROCEDURE — 63600175 PHARM REV CODE 636 W HCPCS: Mod: TXP | Performed by: ANESTHESIOLOGY

## 2018-09-28 PROCEDURE — C1777 LEAD, AICD, ENDO SINGLE COIL: HCPCS | Mod: TXP

## 2018-09-28 PROCEDURE — 63600175 PHARM REV CODE 636 W HCPCS: Mod: TXP | Performed by: NURSE ANESTHETIST, CERTIFIED REGISTERED

## 2018-09-28 PROCEDURE — 33249 INSJ/RPLCMT DEFIB W/LEAD(S): CPT | Mod: Q0,NTX,, | Performed by: INTERNAL MEDICINE

## 2018-09-28 PROCEDURE — 85610 PROTHROMBIN TIME: CPT | Mod: NTX

## 2018-09-28 PROCEDURE — 94761 N-INVAS EAR/PLS OXIMETRY MLT: CPT | Mod: NTX

## 2018-09-28 PROCEDURE — 37000009 HC ANESTHESIA EA ADD 15 MINS: Mod: NTX | Performed by: INTERNAL MEDICINE

## 2018-09-28 PROCEDURE — 25000003 PHARM REV CODE 250: Mod: NTX

## 2018-09-28 PROCEDURE — C1894 INTRO/SHEATH, NON-LASER: HCPCS | Mod: NTX

## 2018-09-28 RX ORDER — EPHEDRINE SULFATE 50 MG/ML
INJECTION, SOLUTION INTRAVENOUS
Status: DISCONTINUED | OUTPATIENT
Start: 2018-09-28 | End: 2018-09-28

## 2018-09-28 RX ORDER — CARVEDILOL 3.12 MG/1
3.12 TABLET ORAL 2 TIMES DAILY
Status: DISCONTINUED | OUTPATIENT
Start: 2018-09-29 | End: 2018-09-29 | Stop reason: HOSPADM

## 2018-09-28 RX ORDER — CEFAZOLIN SODIUM 1 G/3ML
2 INJECTION, POWDER, FOR SOLUTION INTRAMUSCULAR; INTRAVENOUS
Status: COMPLETED | OUTPATIENT
Start: 2018-09-28 | End: 2018-09-28

## 2018-09-28 RX ORDER — CEPHALEXIN 250 MG/1
500 CAPSULE ORAL EVERY 8 HOURS
Status: DISCONTINUED | OUTPATIENT
Start: 2018-09-29 | End: 2018-09-29 | Stop reason: HOSPADM

## 2018-09-28 RX ORDER — DIPHENHYDRAMINE HYDROCHLORIDE 50 MG/ML
25 INJECTION INTRAMUSCULAR; INTRAVENOUS EVERY 6 HOURS PRN
Status: DISCONTINUED | OUTPATIENT
Start: 2018-09-28 | End: 2018-09-28 | Stop reason: HOSPADM

## 2018-09-28 RX ORDER — ATORVASTATIN CALCIUM 20 MG/1
80 TABLET, FILM COATED ORAL DAILY
Status: DISCONTINUED | OUTPATIENT
Start: 2018-09-29 | End: 2018-09-29 | Stop reason: HOSPADM

## 2018-09-28 RX ORDER — CLOPIDOGREL BISULFATE 75 MG/1
75 TABLET ORAL DAILY
Status: DISCONTINUED | OUTPATIENT
Start: 2018-09-29 | End: 2018-09-29 | Stop reason: HOSPADM

## 2018-09-28 RX ORDER — FENTANYL CITRATE 50 UG/ML
INJECTION, SOLUTION INTRAMUSCULAR; INTRAVENOUS
Status: DISCONTINUED | OUTPATIENT
Start: 2018-09-28 | End: 2018-09-28

## 2018-09-28 RX ORDER — FUROSEMIDE 20 MG/1
20 TABLET ORAL DAILY
Status: DISCONTINUED | OUTPATIENT
Start: 2018-09-29 | End: 2018-09-29 | Stop reason: HOSPADM

## 2018-09-28 RX ORDER — ONDANSETRON 2 MG/ML
4 INJECTION INTRAMUSCULAR; INTRAVENOUS EVERY 6 HOURS PRN
Status: DISCONTINUED | OUTPATIENT
Start: 2018-09-28 | End: 2018-09-29 | Stop reason: HOSPADM

## 2018-09-28 RX ORDER — LISINOPRIL 5 MG/1
5 TABLET ORAL DAILY
Status: DISCONTINUED | OUTPATIENT
Start: 2018-09-29 | End: 2018-09-29 | Stop reason: HOSPADM

## 2018-09-28 RX ORDER — CEPHALEXIN 500 MG/1
500 CAPSULE ORAL EVERY 8 HOURS
Status: DISCONTINUED | OUTPATIENT
Start: 2018-09-29 | End: 2018-09-28

## 2018-09-28 RX ORDER — FENTANYL CITRATE 50 UG/ML
25 INJECTION, SOLUTION INTRAMUSCULAR; INTRAVENOUS EVERY 5 MIN PRN
Status: COMPLETED | OUTPATIENT
Start: 2018-09-28 | End: 2018-09-28

## 2018-09-28 RX ORDER — PHENYLEPHRINE HYDROCHLORIDE 10 MG/ML
INJECTION INTRAVENOUS
Status: DISCONTINUED | OUTPATIENT
Start: 2018-09-28 | End: 2018-09-28

## 2018-09-28 RX ORDER — KETAMINE HCL IN 0.9 % NACL 50 MG/5 ML
SYRINGE (ML) INTRAVENOUS
Status: DISCONTINUED | OUTPATIENT
Start: 2018-09-28 | End: 2018-09-28

## 2018-09-28 RX ORDER — ACETAMINOPHEN 325 MG/1
650 TABLET ORAL EVERY 4 HOURS PRN
Status: DISCONTINUED | OUTPATIENT
Start: 2018-09-28 | End: 2018-09-29 | Stop reason: HOSPADM

## 2018-09-28 RX ORDER — OXYCODONE AND ACETAMINOPHEN 5; 325 MG/1; MG/1
1 TABLET ORAL EVERY 6 HOURS PRN
Status: DISCONTINUED | OUTPATIENT
Start: 2018-09-28 | End: 2018-09-28

## 2018-09-28 RX ORDER — CEFAZOLIN SODIUM 1 G/3ML
2 INJECTION, POWDER, FOR SOLUTION INTRAMUSCULAR; INTRAVENOUS
Status: COMPLETED | OUTPATIENT
Start: 2018-09-28 | End: 2018-09-29

## 2018-09-28 RX ORDER — ACETAMINOPHEN 325 MG/1
650 TABLET ORAL EVERY 4 HOURS PRN
Status: DISCONTINUED | OUTPATIENT
Start: 2018-09-28 | End: 2018-09-28

## 2018-09-28 RX ORDER — IODIXANOL 320 MG/ML
INJECTION, SOLUTION INTRAVASCULAR
Status: DISCONTINUED | OUTPATIENT
Start: 2018-09-28 | End: 2018-09-28

## 2018-09-28 RX ORDER — NAPROXEN SODIUM 220 MG/1
81 TABLET, FILM COATED ORAL DAILY
Status: DISCONTINUED | OUTPATIENT
Start: 2018-09-29 | End: 2018-09-29 | Stop reason: HOSPADM

## 2018-09-28 RX ORDER — OXYCODONE AND ACETAMINOPHEN 5; 325 MG/1; MG/1
1 TABLET ORAL EVERY 6 HOURS PRN
Status: DISCONTINUED | OUTPATIENT
Start: 2018-09-28 | End: 2018-09-29 | Stop reason: HOSPADM

## 2018-09-28 RX ORDER — MIDAZOLAM HYDROCHLORIDE 1 MG/ML
INJECTION, SOLUTION INTRAMUSCULAR; INTRAVENOUS
Status: DISCONTINUED | OUTPATIENT
Start: 2018-09-28 | End: 2018-09-28

## 2018-09-28 RX ORDER — SODIUM CHLORIDE 9 MG/ML
INJECTION, SOLUTION INTRAVENOUS CONTINUOUS
Status: DISCONTINUED | OUTPATIENT
Start: 2018-09-28 | End: 2018-09-28

## 2018-09-28 RX ORDER — CEFAZOLIN SODIUM 1 G/3ML
2 INJECTION, POWDER, FOR SOLUTION INTRAMUSCULAR; INTRAVENOUS
Status: DISCONTINUED | OUTPATIENT
Start: 2018-09-28 | End: 2018-09-28

## 2018-09-28 RX ADMIN — OXYCODONE HYDROCHLORIDE AND ACETAMINOPHEN 1 TABLET: 5; 325 TABLET ORAL at 11:09

## 2018-09-28 RX ADMIN — ONDANSETRON HYDROCHLORIDE 4 MG: 2 INJECTION, SOLUTION INTRAMUSCULAR; INTRAVENOUS at 09:09

## 2018-09-28 RX ADMIN — MIDAZOLAM 1 MG: 1 INJECTION INTRAMUSCULAR; INTRAVENOUS at 02:09

## 2018-09-28 RX ADMIN — FENTANYL CITRATE 25 MCG: 50 INJECTION INTRAMUSCULAR; INTRAVENOUS at 06:09

## 2018-09-28 RX ADMIN — PHENYLEPHRINE HYDROCHLORIDE 100 MCG: 10 INJECTION INTRAVENOUS at 05:09

## 2018-09-28 RX ADMIN — FENTANYL CITRATE 50 MCG: 50 INJECTION, SOLUTION INTRAMUSCULAR; INTRAVENOUS at 01:09

## 2018-09-28 RX ADMIN — CEFAZOLIN 2 G: 330 INJECTION, POWDER, FOR SOLUTION INTRAMUSCULAR; INTRAVENOUS at 08:09

## 2018-09-28 RX ADMIN — FENTANYL CITRATE 25 MCG: 50 INJECTION INTRAMUSCULAR; INTRAVENOUS at 05:09

## 2018-09-28 RX ADMIN — EPHEDRINE SULFATE 15 MG: 50 INJECTION, SOLUTION INTRAMUSCULAR; INTRAVENOUS; SUBCUTANEOUS at 05:09

## 2018-09-28 RX ADMIN — OXYCODONE HYDROCHLORIDE AND ACETAMINOPHEN 1 TABLET: 5; 325 TABLET ORAL at 05:09

## 2018-09-28 RX ADMIN — DEXMEDETOMIDINE HYDROCHLORIDE 0.5 MCG/KG/HR: 100 INJECTION, SOLUTION, CONCENTRATE INTRAVENOUS at 01:09

## 2018-09-28 RX ADMIN — Medication 10 MG: at 01:09

## 2018-09-28 RX ADMIN — SODIUM CHLORIDE: 0.9 INJECTION, SOLUTION INTRAVENOUS at 01:09

## 2018-09-28 RX ADMIN — CEFAZOLIN 2 G: 330 INJECTION, POWDER, FOR SOLUTION INTRAMUSCULAR; INTRAVENOUS at 01:09

## 2018-09-28 RX ADMIN — MIDAZOLAM 2 MG: 1 INJECTION INTRAMUSCULAR; INTRAVENOUS at 01:09

## 2018-09-28 RX ADMIN — IODIXANOL 10 ML: 320 INJECTION, SOLUTION INTRAVASCULAR at 02:09

## 2018-09-28 RX ADMIN — Medication 10 MG: at 02:09

## 2018-09-28 NOTE — ANESTHESIA PREPROCEDURE EVALUATION
09/28/2018  Osei Castorena is a 53 y.o., male   Patient Active Problem List   Diagnosis    Hyperlipidemia    Insomnia    Myocardial infarction    Congestive heart failure    S/P angioplasty with stent    Chronic combined systolic and diastolic heart failure    Coronary artery disease involving native coronary artery of native heart without angina pectoris    Ischemic cardiomyopathy    RBBB     .    Anesthesia Evaluation         Review of Systems      Physical Exam  General:  Well nourished    Airway/Jaw/Neck:  Airway Findings: Mouth Opening: Normal Tongue: Normal  General Airway Assessment: Adult  Mallampati: I  Improves to I with phonation.  TM Distance: Normal, at least 6 cm      Dental:  Dental Findings: In tactAll teeth decayed and rotten.    Chest/Lungs:  Chest/Lungs Findings: Clear to auscultation     Heart/Vascular:  Heart Findings: Rate: Normal  Rhythm: Regular Rhythm  Sounds: Normal        Mental Status:  Mental Status Findings:  Cooperative, Alert and Oriented         Anesthesia Plan  Type of Anesthesia, risks & benefits discussed:  Anesthesia Type:  MAC  Patient's Preference: Sedation  Intra-op Monitoring Plan: standard ASA monitors  Intra-op Monitoring Plan Comments:   Post Op Pain Control Plan: per primary service following discharge from PACU  Post Op Pain Control Plan Comments:   Induction:   IV  Beta Blocker:  Patient is not currently on a Beta-Blocker (No further documentation required).       Informed Consent: Patient understands risks and agrees with Anesthesia plan.  Questions answered.   ASA Score: 4     Day of Surgery Review of History & Physical:    H&P update referred to the surgeon.     Anesthesia Plan Notes: Sedation plan discussed.          Ready For Surgery From Anesthesia Perspective.

## 2018-09-28 NOTE — SUBJECTIVE & OBJECTIVE
Past Medical History:   Diagnosis Date    CHF (congestive heart failure)     Hyperlipidemia     Myocardial infarction        Past Surgical History:   Procedure Laterality Date    APPENDECTOMY      HEART CATH-RIGHT Right 5/22/2018    Performed by Tunde Hernadez MD at [facility] CATH LAB       Review of patient's allergies indicates:  No Known Allergies    No current facility-administered medications on file prior to encounter.      Current Outpatient Medications on File Prior to Encounter   Medication Sig    aspirin 81 MG Chew Take 81 mg by mouth once daily.    atorvastatin (LIPITOR) 80 MG tablet Take 80 mg by mouth once daily.    carvedilol (COREG) 3.125 MG tablet 3.125 mg 2 (two) times daily. 2 tabs qd    clopidogrel (PLAVIX) 75 mg tablet Take 75 mg by mouth once daily.    furosemide (LASIX) 20 MG tablet Take 1 tablet by mouth once daily.    lisinopril (PRINIVIL,ZESTRIL) 5 MG tablet Take 1 tablet by mouth once daily.    metoprolol succinate (TOPROL-XL) 25 MG 24 hr tablet Take 1 tablet by mouth once daily.    spironolactone (ALDACTONE) 25 MG tablet Take 1 tablet (25 mg total) by mouth once daily.     Family History     None        Tobacco Use    Smoking status: Current Some Day Smoker     Packs/day: 0.50    Smokeless tobacco: Never Used   Substance and Sexual Activity    Alcohol use: No    Drug use: Not on file    Sexual activity: Not on file     ROS     Constitution: Negative for fevers/chills.   Positive for easily gets    weak and has malaise/fatigue.   HENT: Negative for ear pain and tinnitus.   Eyes: Negative for blurred vision.   Cardiovascular:  Negative for chest pain,  near-syncope, and syncope.   Respiratory:  Positive for shortness of breath on exertion   Endocrine:  Negative for polyuria.   Hematologic/Lymphatic: Negative for bruise/bleed easily.   Skin:  Negative for rash.   Musculoskeletal: Negative for joint pain and muscle weakness.   Extremity: Negative for swelling in the lower  extremities.   Gastrointestinal:  Negative for abdominal pain and change in bowel habit.   Genitourinary: Negative for frequency.   Neurological:  Positive  For occasional  dizziness.   Psychiatric/Behavioral:  Negative for depression, anxiety       Objective:     Vital Signs (Most Recent):  Temp: 96.8 °F (36 °C) (09/28/18 1109)  Pulse: 63 (09/28/18 1109)  Resp: 18 (09/28/18 1109)  BP: (!) 100/59 (09/28/18 1114)  SpO2: 100 % (09/28/18 1109) Vital Signs (24h Range):  Temp:  [96.8 °F (36 °C)] 96.8 °F (36 °C)  Pulse:  [63] 63  Resp:  [18] 18  SpO2:  [100 %] 100 %  BP: (100-106)/(59-62) 100/59       Weight: 56.7 kg (125 lb)  Body mass index is 20.8 kg/m².    SpO2: 100 %  O2 Device (Oxygen Therapy): room air    Physical Exam    PE:   General: Well developed, well nourished, No distress on room air   HEENT: Head is normocephalic, atraumatic;  Lungs: Clear to auscultation bilaterally.  No wheezing. Normal respiratory effort.  Cardiovascular:  S1 S2 Normal rate, regular rhythm and normal heart sounds.    PMI is not displaced  Extremities: No LE edema. Pulses 2+ and symmetric.   Abdomen: Abdomen is soft, non-tender non-distended with normal bowel sounds.  Skin: Skin color, texture, turgor normal. No rashes.  Musculoskeletal: normal range of motion   Neurologic: Normal strength and tone. No focal numbness or weakness.   Psychiatric: normal mood and affect.  behavior is normal. Alert and oriented X 3.  Labs reviewed           Significant Imaging: reviewed

## 2018-09-28 NOTE — PROGRESS NOTES
Attempted to call report to O[facility] 3rd floor. Nurse assigned is discharging another patient. Instructed to call back in 10 minutes.

## 2018-09-28 NOTE — H&P
[facility]  Cardiac Electrophysiology  History and Physical     Admission Date: 9/28/2018  Code Status: No Order   Attending Provider: Daniel Chand MD   Principal Problem:Ischemic cardiomyopathy    Subjective:     Chief Complaint:  ICM     HPI:   53 year-old male with  myocardial infarction in 2017 s/p RCA PCI, hypertension, and severe ischemic cardiomyopathy with LVEF <10% with NYHA class III symptoms despite revascularization and max tolerated medical therapy with beta-blocker/lisinopril for greater than 3 months. He is wearing a lifevest. He has never been shocked.  Pt evaluated by EP  MD Chand, and patient elected to proceed for CRT-D implant.    Pt presented to short stay cardiology unit.  Pt denies any acute symptoms at present such as fevers, chills, overt chest pains.     ECHO 6/2018 CONCLUSIONS     1 - Eccentric LVH with severely depressed left ventricular systolic function (EF <10%).     2 - Severe left atrial enlargement.     3 - Impaired LV relaxation, normal LAP (grade 1 diastolic dysfunction).     4 - Right ventricle is upper limit of normal in size with normal systolic function.     5 - Mild to moderate tricuspid regurgitation.     6 - The estimated PA systolic pressure is 30 mmHg.     7 - Mild mitral regurgitation.      My interpretation of today's ECG 9/28/18  is sinus rhythm with RBBB and qrs duration of 172msec.        Past Medical History:   Diagnosis Date    CHF (congestive heart failure)     Hyperlipidemia     Myocardial infarction        Past Surgical History:   Procedure Laterality Date    APPENDECTOMY      HEART CATH-RIGHT Right 5/22/2018    Performed by Tunde Hernadez MD at [facility] CATH LAB       Review of patient's allergies indicates:  No Known Allergies    No current facility-administered medications on file prior to encounter.      Current Outpatient Medications on File Prior to Encounter   Medication Sig    aspirin 81 MG Chew Take 81 mg by mouth once  daily.    atorvastatin (LIPITOR) 80 MG tablet Take 80 mg by mouth once daily.    carvedilol (COREG) 3.125 MG tablet 3.125 mg 2 (two) times daily. 2 tabs qd    clopidogrel (PLAVIX) 75 mg tablet Take 75 mg by mouth once daily.    furosemide (LASIX) 20 MG tablet Take 1 tablet by mouth once daily.    lisinopril (PRINIVIL,ZESTRIL) 5 MG tablet Take 1 tablet by mouth once daily.    metoprolol succinate (TOPROL-XL) 25 MG 24 hr tablet Take 1 tablet by mouth once daily.    spironolactone (ALDACTONE) 25 MG tablet Take 1 tablet (25 mg total) by mouth once daily.     Family History     None        Tobacco Use    Smoking status: Current Some Day Smoker     Packs/day: 0.50    Smokeless tobacco: Never Used   Substance and Sexual Activity    Alcohol use: No    Drug use: Not on file    Sexual activity: Not on file     ROS     Constitution: Negative for fevers/chills.   Positive for easily gets    weak and has malaise/fatigue.   HENT: Negative for ear pain and tinnitus.   Eyes: Negative for blurred vision.   Cardiovascular:  Reports occasional palpitations  Negative for chest pain,  near-syncope, and syncope.   Respiratory:  Positive for shortness of breath on exertion   Endocrine:  Negative for polyuria.   Hematologic/Lymphatic: Negative for bruise/bleed easily.   Skin:  Negative for rash.   Musculoskeletal: Negative for joint pain and muscle weakness.   Extremity: Negative for swelling in the lower extremities.   Gastrointestinal:  Negative for abdominal pain and change in bowel habit.   Genitourinary: Negative for frequency.   Neurological:  Positive  For occasional  dizziness.   Psychiatric/Behavioral:  Negative for depression, anxiety       Objective:     Vital Signs (Most Recent):  Temp: 96.8 °F (36 °C) (09/28/18 1109)  Pulse: 63 (09/28/18 1109)  Resp: 18 (09/28/18 1109)  BP: (!) 100/59 (09/28/18 1114)  SpO2: 100 % (09/28/18 1109) Vital Signs (24h Range):  Temp:  [96.8 °F (36 °C)] 96.8 °F (36 °C)  Pulse:  [63]  63  Resp:  [18] 18  SpO2:  [100 %] 100 %  BP: (100-106)/(59-62) 100/59       Weight: 56.7 kg (125 lb)  Body mass index is 20.8 kg/m².    SpO2: 100 %  O2 Device (Oxygen Therapy): room air      PE:   General: Well developed, well nourished, No distress on room air   HEENT: Head is normocephalic, atraumatic;  Lungs: Clear to auscultation bilaterally.  No wheezing. Normal respiratory effort.  Cardiovascular:  S1 S2 Normal rate, regular rhythm and normal heart sounds.    PMI is not displaced  Extremities: No LE edema.   Abdomen: Abdomen is soft, non-tender non-distended with normal bowel sounds.  Skin: Skin color, texture, turgor normal. No rashes.  Musculoskeletal: normal range of motion   Neurologic: Normal strength and tone. No focal numbness or weakness.   Psychiatric: normal mood and affect.  behavior is normal. Alert and oriented X 3.  Labs reviewed     Significant Imaging: reviewed     Assessment and Plan:     * Ischemic cardiomyopathy        Plan  CRT-D, [facility]  Anesthesia for sedation                   Consent signed in clinic  The patient   voices understanding and all questions have been answered. No further questions/concerns voiced at this time.      Marissa Marin NP  Cardiac Electrophysiology  [facility]    STAFF MD Daniel Chand

## 2018-09-28 NOTE — TRANSFER OF CARE
"Anesthesia Transfer of Care Note    Patient: Osei Castorena    Procedure(s) Performed: Procedure(s) (LRB):  INSERTION, ICD, BIVENTRICULAR (N/A)    Patient location: PACU    Anesthesia Type: general    Transport from OR: Transported from OR on 6-10 L/min O2 by face mask with adequate spontaneous ventilation    Post pain: adequate analgesia    Post assessment: no apparent anesthetic complications and tolerated procedure well    Post vital signs: stable    Level of consciousness: awake and alert    Nausea/Vomiting: no nausea/vomiting    Complications: none          Last vitals:   Visit Vitals  BP (!) 106/46 (BP Location: Right arm, Patient Position: Lying)   Pulse 68   Temp 36.3 °C (97.3 °F)   Resp 20   Ht 5' 5" (1.651 m)   Wt 56.7 kg (125 lb)   SpO2 95%   BMI 20.80 kg/m²     "

## 2018-09-28 NOTE — HPI
53 year-old male with  myocardial infarction in 2017 s/p RCA PCI, hypertension, and severe ischemic cardiomyopathy with LVEF <10% with NYHA class III symptoms despite revascularization and max tolerated medical therapy with beta-blocker/lisinopril for greater than 3 months. Presents for CRT-D implant.

## 2018-09-29 VITALS
HEART RATE: 50 BPM | OXYGEN SATURATION: 98 % | RESPIRATION RATE: 18 BRPM | TEMPERATURE: 97 F | SYSTOLIC BLOOD PRESSURE: 103 MMHG | DIASTOLIC BLOOD PRESSURE: 61 MMHG | WEIGHT: 125 LBS | HEIGHT: 65 IN | BODY MASS INDEX: 20.83 KG/M2

## 2018-09-29 PROCEDURE — 63600175 PHARM REV CODE 636 W HCPCS: Mod: NTX | Performed by: NURSE PRACTITIONER

## 2018-09-29 PROCEDURE — 63600175 PHARM REV CODE 636 W HCPCS: Mod: TXP | Performed by: STUDENT IN AN ORGANIZED HEALTH CARE EDUCATION/TRAINING PROGRAM

## 2018-09-29 PROCEDURE — 25000003 PHARM REV CODE 250: Mod: NTX | Performed by: NURSE PRACTITIONER

## 2018-09-29 RX ORDER — CEPHALEXIN 500 MG/1
500 CAPSULE ORAL EVERY 8 HOURS
Qty: 15 CAPSULE | Refills: 0 | Status: SHIPPED | OUTPATIENT
Start: 2018-09-29 | End: 2018-10-04

## 2018-09-29 RX ORDER — DEXTROMETHORPHAN HYDROBROMIDE, GUAIFENESIN 5; 100 MG/5ML; MG/5ML
650 LIQUID ORAL EVERY 8 HOURS
Refills: 0 | COMMUNITY
Start: 2018-09-29 | End: 2018-10-06

## 2018-09-29 RX ADMIN — CEFAZOLIN 2 G: 330 INJECTION, POWDER, FOR SOLUTION INTRAMUSCULAR; INTRAVENOUS at 06:09

## 2018-09-29 RX ADMIN — ONDANSETRON HYDROCHLORIDE 4 MG: 2 INJECTION, SOLUTION INTRAMUSCULAR; INTRAVENOUS at 06:09

## 2018-09-29 RX ADMIN — ASPIRIN 81 MG CHEWABLE TABLET 81 MG: 81 TABLET CHEWABLE at 09:09

## 2018-09-29 RX ADMIN — CLOPIDOGREL BISULFATE 75 MG: 75 TABLET ORAL at 09:09

## 2018-09-29 RX ADMIN — FUROSEMIDE 20 MG: 20 TABLET ORAL at 09:09

## 2018-09-29 RX ADMIN — CARVEDILOL 3.12 MG: 3.12 TABLET, FILM COATED ORAL at 09:09

## 2018-09-29 RX ADMIN — SPIRONOLACTONE 25 MG: 25 TABLET, FILM COATED ORAL at 09:09

## 2018-09-29 RX ADMIN — ATORVASTATIN CALCIUM 80 MG: 20 TABLET, FILM COATED ORAL at 09:09

## 2018-09-29 RX ADMIN — LISINOPRIL 5 MG: 5 TABLET ORAL at 09:09

## 2018-09-29 RX ADMIN — OXYCODONE HYDROCHLORIDE AND ACETAMINOPHEN 1 TABLET: 5; 325 TABLET ORAL at 06:09

## 2018-09-29 NOTE — PROGRESS NOTES
Patient verbalized that his blood pressure is normally on the low side; with systolic; 90's and sometimes below 90's.

## 2018-09-29 NOTE — NURSING TRANSFER
Nursing Transfer Note      9/28/2018     Transfer To: [facility] [id number]    Transfer via stretcher    Transfer with cardiac monitoring, Patient shoes in a bag, patient's belongings in a gray bag    Transported by PCT    Medicines sent: N/a    Chart send with patient: Yes    Notified: Patient does not want me to call his friend    Patient reassessed at: 9/28/2018

## 2018-09-29 NOTE — PROGRESS NOTES
Attempted to call report to O[facility]- 3rd floor. Instructed to call back in 10 minutes by charge nurse Mary

## 2018-09-29 NOTE — HOSPITAL COURSE
CRT-D implanted without complication. Pressure dressing and mepilex overnight, removed in AM. Incision c/d/i, no hematoma, minimal tenderness. Discharged home in good condition with 5 days abx. Device clinic follow up in 1 week, then with Dr. Chand in 3 months.

## 2018-09-29 NOTE — PLAN OF CARE
Pt being discharged home and will need transportation.  CM did verify patient's address.  Will set up transportation thru [facility]'s.  Pt will be ready to discharge when ride arrives.     Ride in will call.  Nurse will call when patient discharged.     Pt discharged, ride will pick patient up at 11:45pm

## 2018-09-29 NOTE — PLAN OF CARE
Patient is warm and comfortable as verbalized . Pain is controlled, denies PONV. Denies any chest pain. Vital signs are stable and within normal limit. Surgical site is clean, dry and intact-dressing in place.

## 2018-09-29 NOTE — NURSING
Received pt lying awake in bed. Surgical site is c/d/i. Immobilizer maintained with ice packs applied. Denies CP. Pain remains controlled. Safety precautions in place. Will continue to monitor.

## 2018-09-29 NOTE — PLAN OF CARE
Problem: Patient Care Overview  Goal: Plan of Care Review  Outcome: Outcome(s) achieved Date Met: 09/29/18  Patient denies pain of Right shoulder. Effective pain relief with medication. Ambulation is steady. This nurse educated patient on post implantation education. Patient agrees and understands. Cardiology was in to see patient, reviewed education, showering and took off dressing. Incision open to air. Pain is tolerating food and liquids. Denies n/v. Case management secured transportation for patient and will arrive approximately at 11:45 am. Patient possessions are at bedside in a duffle bag and one clear bag. Reviewed AVS with patient.

## 2018-09-29 NOTE — DISCHARGE SUMMARY
[facility]  Cardiac Electrophysiology  Discharge Summary      Patient Name: Osei Castorena  MRN: [id number]  Admission Date: 9/28/2018  Hospital Length of Stay: 0 days  Discharge Date and Time:  09/29/2018 11:25 AM  Attending Physician: Daniel Chand MD    Discharging Provider: Lloyd Cano MD  Primary Care Physician: Onesimo Vang NP    HPI:    53 year-old male with  myocardial infarction in 2017 s/p RCA PCI, hypertension, and severe ischemic cardiomyopathy with LVEF <10% with NYHA class III symptoms despite revascularization and max tolerated medical therapy with beta-blocker/lisinopril for greater than 3 months. Presents for CRT-D implant.    Procedure(s) (LRB):  INSERTION, ICD, BIVENTRICULAR (N/A)     Indwelling Lines/Drains at time of discharge:  Lines/Drains/Airways          None          Hospital Course:  CRT-D implanted without complication. Pressure dressing and mepilex overnight, removed in AM. Incision c/d/i, no hematoma, minimal tenderness. Discharged home in good condition with 5 days abx. Device clinic follow up in 1 week, then with Dr. Chand in 3 months.    Consults:     Significant Diagnostic Studies: Labs:   CMP   Recent Labs   Lab  09/28/18   1049   NA  141   K  3.6   CL  108   CO2  26   GLU  90   BUN  8   CREATININE  0.8   CALCIUM  9.1   ANIONGAP  7*   ESTGFRAFRICA  >60.0   EGFRNONAA  >60.0   , CBC   Recent Labs   Lab  09/28/18   1049   WBC  6.42   HGB  12.8*   HCT  39.0*   PLT  334    and INR   Lab Results   Component Value Date    INR 1.0 09/28/2018    INR 1.0 05/22/2018       Pending Diagnostic Studies:     None          Final Active Diagnoses:    Diagnosis Date Noted POA    PRINCIPAL PROBLEM:  Ischemic cardiomyopathy [I25.5] 06/06/2018 Yes      Problems Resolved During this Admission:     No new Assessment & Plan notes have been filed under this hospital service since the last note was generated.  Service: Arrhythmia      Discharged Condition: good    Disposition:  Home or Self Care    Follow Up:  Follow-up Information     Daniel Chand MD.    Specialties:  Cardiology, Electrophysiology  Contact information:  Renetta GLORIA  [facility] [id number] [phone]             PACEMAKERCLINIC, CARDIOLOGY In 1 week.    Specialty:  Cardiology  Why:  Device/wound check               Patient Instructions:   No discharge procedures on file.  Medications:  Reconciled Home Medications:      Medication List      START taking these medications    acetaminophen 650 MG Tbsr  Commonly known as:  TYLENOL  Take 1 tablet (650 mg total) by mouth every 8 (eight) hours. for 7 days     cephALEXin 500 MG capsule  Commonly known as:  KEFLEX  Take 1 capsule (500 mg total) by mouth every 8 (eight) hours for 5 days        CONTINUE taking these medications    aspirin 81 MG Chew  Take 81 mg by mouth once daily.     atorvastatin 80 MG tablet  Commonly known as:  LIPITOR  Take 80 mg by mouth once daily.     carvedilol 3.125 MG tablet  Commonly known as:  COREG  3.125 mg 2 (two) times daily. 2 tabs qd     clopidogrel 75 mg tablet  Commonly known as:  PLAVIX  Take 75 mg by mouth once daily.     furosemide 20 MG tablet  Commonly known as:  LASIX  Take 1 tablet by mouth once daily.     lisinopril 5 MG tablet  Commonly known as:  PRINIVIL,ZESTRIL  Take 1 tablet by mouth once daily.     spironolactone 25 MG tablet  Commonly known as:  ALDACTONE  Take 1 tablet (25 mg total) by mouth once daily.            Time spent on the discharge of patient: 10 minutes    Lloyd Cano MD  Cardiac Electrophysiology  [facility]

## 2018-09-29 NOTE — ANESTHESIA POSTPROCEDURE EVALUATION
"Anesthesia Post Evaluation    Patient: Osei Castorena    Procedure(s) Performed: Procedure(s) (LRB):  INSERTION, ICD, BIVENTRICULAR (N/A)    Final Anesthesia Type: general  Patient location during evaluation: floor  Patient participation: Yes- Able to Participate  Level of consciousness: awake and alert  Post-procedure vital signs: reviewed and stable  Pain management: adequate  Airway patency: patent  PONV status at discharge: No PONV  Anesthetic complications: no      Cardiovascular status: blood pressure returned to baseline  Respiratory status: unassisted  Hydration status: euvolemic  Follow-up not needed.        Visit Vitals  /61 (BP Location: Right arm, Patient Position: Lying)   Pulse (!) 50   Temp 35.9 °C (96.6 °F) (Oral)   Resp 18   Ht 5' 5" (1.651 m)   Wt 56.7 kg (125 lb 0 oz)   SpO2 98%   BMI 20.80 kg/m²       Pain/Saundra Score: Pain Assessment Performed: Yes (9/29/2018  8:05 AM)  Presence of Pain: denies (9/29/2018  8:05 AM)  Pain Rating Prior to Med Admin: 9 (9/29/2018  6:04 AM)  Pain Rating Post Med Admin: 0 (9/29/2018  7:04 AM)  Saundra Score: 10 (9/29/2018  8:05 AM)        "

## 2018-09-29 NOTE — PROGRESS NOTES
Attempted to call report to OBS 3rd floor. Instructed to call back in 10 minutes by charge nurse Mary

## 2018-10-09 ENCOUNTER — TELEPHONE (OUTPATIENT)
Dept: TRANSPLANT | Facility: CLINIC | Age: 53
End: 2018-10-09

## 2018-10-15 ENCOUNTER — TELEPHONE (OUTPATIENT)
Dept: ELECTROPHYSIOLOGY | Facility: CLINIC | Age: 53
End: 2018-10-15

## 2018-10-15 NOTE — TELEPHONE ENCOUNTER
Attempted to return call in reference to being seen sooner. Pts phone is not accepting calls. Could not leave message

## 2018-10-15 NOTE — TELEPHONE ENCOUNTER
Pt missed his post-op wound check.      Attempting to reach pt to discuss re-scheduling.  No answer, phone does not seem to be in service.      Called emergency contact listed, donita Garcia 2.  No answer.  Message left asking to return call to clinic.     Note-pt not on [facility] and no email on file.

## 2018-10-15 NOTE — TELEPHONE ENCOUNTER
Left voicemail for patient (via emergency contact# Jose Silvestre) to contact the clinic re: scheduling an appointment, per his request.

## 2018-10-15 NOTE — TELEPHONE ENCOUNTER
Spoke with Mr. Garcia, asked if he had phone number for Mr. Castorena.  He states he does not have a phone, but he is at his house and gave phone to Mr. Castorena.    Mr. Castorena states he is doing well and was unable to make wound appt due to transportation did not show up to pick him up.  He states he is able to come for wound check on Wed around 1130.  Spoke with Zofia who is going to call pt re scheduling wound check.

## 2018-10-17 ENCOUNTER — CLINICAL SUPPORT (OUTPATIENT)
Dept: ELECTROPHYSIOLOGY | Facility: CLINIC | Age: 53
End: 2018-10-17
Attending: INTERNAL MEDICINE
Payer: MEDICAID

## 2018-10-17 DIAGNOSIS — I25.5 ISCHEMIC CARDIOMYOPATHY: ICD-10-CM

## 2018-10-17 PROCEDURE — 93284 PRGRMG EVAL IMPLANTABLE DFB: CPT | Mod: PBBFAC,NTX | Performed by: INTERNAL MEDICINE

## 2018-10-25 ENCOUNTER — OFFICE VISIT (OUTPATIENT)
Dept: TRANSPLANT | Facility: CLINIC | Age: 53
End: 2018-10-25
Payer: MEDICAID

## 2018-10-25 ENCOUNTER — DOCUMENTATION ONLY (OUTPATIENT)
Dept: TRANSPLANT | Facility: CLINIC | Age: 53
End: 2018-10-25

## 2018-10-25 ENCOUNTER — SOCIAL WORK (OUTPATIENT)
Dept: TRANSPLANT | Facility: CLINIC | Age: 53
End: 2018-10-25

## 2018-10-25 ENCOUNTER — LAB VISIT (OUTPATIENT)
Dept: LAB | Facility: HOSPITAL | Age: 53
End: 2018-10-25
Payer: MEDICAID

## 2018-10-25 VITALS
BODY MASS INDEX: 19.79 KG/M2 | WEIGHT: 118.81 LBS | HEIGHT: 65 IN | DIASTOLIC BLOOD PRESSURE: 53 MMHG | HEART RATE: 66 BPM | SYSTOLIC BLOOD PRESSURE: 90 MMHG

## 2018-10-25 DIAGNOSIS — I50.42 CHRONIC COMBINED SYSTOLIC AND DIASTOLIC HEART FAILURE: Primary | ICD-10-CM

## 2018-10-25 DIAGNOSIS — I50.42 CHRONIC COMBINED SYSTOLIC AND DIASTOLIC HEART FAILURE: ICD-10-CM

## 2018-10-25 DIAGNOSIS — Z76.82 HEART TRANSPLANT CANDIDATE: ICD-10-CM

## 2018-10-25 DIAGNOSIS — I25.5 ISCHEMIC CARDIOMYOPATHY: ICD-10-CM

## 2018-10-25 DIAGNOSIS — F17.200 SMOKER: ICD-10-CM

## 2018-10-25 LAB
ANION GAP SERPL CALC-SCNC: 6 MMOL/L
BUN SERPL-MCNC: 9 MG/DL
CALCIUM SERPL-MCNC: 9.7 MG/DL
CHLORIDE SERPL-SCNC: 103 MMOL/L
CO2 SERPL-SCNC: 28 MMOL/L
CREAT SERPL-MCNC: 0.9 MG/DL
EST. GFR  (AFRICAN AMERICAN): >60 ML/MIN/1.73 M^2
EST. GFR  (NON AFRICAN AMERICAN): >60 ML/MIN/1.73 M^2
GLUCOSE SERPL-MCNC: 86 MG/DL
POTASSIUM SERPL-SCNC: 4.5 MMOL/L
SODIUM SERPL-SCNC: 137 MMOL/L

## 2018-10-25 PROCEDURE — 99214 OFFICE O/P EST MOD 30 MIN: CPT | Mod: PBBFAC,TXP | Performed by: INTERNAL MEDICINE

## 2018-10-25 PROCEDURE — 36415 COLL VENOUS BLD VENIPUNCTURE: CPT | Mod: TXP

## 2018-10-25 PROCEDURE — 99999 PR PBB SHADOW E&M-EST. PATIENT-LVL IV: CPT | Mod: PBBFAC,TXP,, | Performed by: INTERNAL MEDICINE

## 2018-10-25 PROCEDURE — 80048 BASIC METABOLIC PNL TOTAL CA: CPT | Mod: NTX

## 2018-10-25 PROCEDURE — 99214 OFFICE O/P EST MOD 30 MIN: CPT | Mod: S$PBB,NTX,, | Performed by: INTERNAL MEDICINE

## 2018-10-25 RX ORDER — LISINOPRIL 10 MG/1
10 TABLET ORAL DAILY
Qty: 30 TABLET | Refills: 12 | Status: ON HOLD | OUTPATIENT
Start: 2018-10-25 | End: 2019-01-11 | Stop reason: DRUGHIGH

## 2018-10-25 RX ORDER — ATORVASTATIN CALCIUM 80 MG/1
80 TABLET, FILM COATED ORAL DAILY
Qty: 30 TABLET | Refills: 12 | Status: SHIPPED | OUTPATIENT
Start: 2018-10-25 | End: 2019-01-29 | Stop reason: SDUPTHER

## 2018-10-25 RX ORDER — FUROSEMIDE 20 MG/1
20 TABLET ORAL DAILY
Qty: 30 TABLET | Refills: 12 | Status: SHIPPED | OUTPATIENT
Start: 2018-10-25 | End: 2019-01-29 | Stop reason: SDUPTHER

## 2018-10-25 RX ORDER — CARVEDILOL 3.12 MG/1
3.12 TABLET ORAL 2 TIMES DAILY
Qty: 60 TABLET | Refills: 12 | Status: ON HOLD | OUTPATIENT
Start: 2018-10-25 | End: 2019-01-11

## 2018-10-25 RX ORDER — CLOPIDOGREL BISULFATE 75 MG/1
75 TABLET ORAL DAILY
Qty: 30 TABLET | Refills: 12 | Status: SHIPPED | OUTPATIENT
Start: 2018-10-25 | End: 2019-01-29 | Stop reason: SDUPTHER

## 2018-10-25 RX ORDER — NAPROXEN SODIUM 220 MG/1
81 TABLET, FILM COATED ORAL DAILY
COMMUNITY
Start: 2018-10-25

## 2018-10-25 RX ORDER — SPIRONOLACTONE 25 MG/1
25 TABLET ORAL DAILY
Qty: 30 TABLET | Refills: 11 | Status: SHIPPED | OUTPATIENT
Start: 2018-10-25 | End: 2019-01-29 | Stop reason: SDUPTHER

## 2018-10-25 NOTE — LETTER
October 27, 2018        Ricky Gannon  [address]  [address]  [place] [address]  Phone: [phone]  Fax: [phone]             [facility] [address]  [facility] [address]  Phone: [phone]   Patient: Osei Castorena   MR Number: [id number]   YOB: 1965   Date of Visit: 10/25/2018       Dear Dr. Ricky Gannon    Thank you for referring Osei Castorena to me for evaluation. Attached you will find relevant portions of my assessment and plan of care.    If you have questions, please do not hesitate to call me. I look forward to following Osei Castorena along with you.    Sincerely,    Khoa oRsa MD    Enclosure    If you would like to receive this communication electronically, please contact [email or web address] or [phone] to request [employer] Link access.    [employer] Link is a tool which provides read-only access to select patient information with whom you have a relationship. Its easy to use and provides real time access to review your patients record including encounter summaries, notes, results, and demographic information.    If you feel you have received this communication in error or would no longer like to receive these types of communications, please e-mail [email or web address]

## 2018-10-25 NOTE — ASSESSMENT & PLAN NOTE
OHT continued smoking   LVAD - small LV ( < 6 cm) and finances (out of job - no money)  FOLLOW IN CHF

## 2018-10-25 NOTE — PROGRESS NOTES
Pt presents in clinic alone, aaox4 with open affect. Pt reports took Medicaid Transportation to [facility] today, from [place]. Pt reports dire financial situation: no income due to not allowed to work since getting pacemaker, owes $600.00 toward electric bill, has no phone, truck was repossessed. Pt reports only family is a male cousin in [facility], near [place]. Pt reports has received food from local [employer], but can only go 1x/month and it is only non-perishables. Pt reports friends providing some assistance but are not that well off themselves. Pt has reapplied for Disability 11 days ago, since being unable to work and was told to expect a decision in 30 days. Pt states he may be able to go stay with his cousin but wants to keep that as a last resort. Pt denies [employer] community.     Due to pt has no income or social support that would be sufficient for LVAD or Transplant, pt is not a suitable candidate for consideration of advanced heart failure options at this time.    Provided pt with a list of other resources in [facility], as follows,    [facility] [facility]  [address] [place]  [phone]    [address] [address] [phone]  (25 lb boxes for 1-2 persons, 2nd Thursdays 9-11:30 am    [facility]  [address]  [place] [address] [phone]  Hot meal daily (7days/week, 365 days/year) 11:30 am-1:15pm    [employer]  [phone]    [employer] [address] [place]  [phone]

## 2018-10-25 NOTE — PATIENT INSTRUCTIONS
TAKE coreg 3.125 TWICE A day   Take lisinopril at night         Sacubitril; Valsartan oral tablet  What is this medicine?  SACUBITRIL; VALSARTAN (sak UE bi tril; sim KATI tan) is a combination of 2 drugs used to reduce the risk of death and hospitalizations in people with long-lasting heart failure. It is usually used with other medicines to treat heart failure.  How should I use this medicine?  Take this medicine by mouth with a glass of water. Follow the directions on the prescription label. You can take it with or   without food. If it upsets your stomach, take it with food. Take your medicine at regular intervals. Do not take it more   often than directed. Do not stop taking except on your doctor's advice.  Talk to your pediatrician regarding the use of this medicine in children. Special care may be needed.  What side effects may I notice from receiving this medicine?  Side effects that you should report to your doctor or health care professional as soon as possible:  · allergic reactions like skin rash, itching or hives, swelling of the face, lips, or tongue  · signs and symptoms of increased potassium like muscle weakness; chest pain; or fast, irregular heartbeat  · signs and symptoms of kidney injury like trouble passing urine or change in the amount of urine  · signs and symptoms of low blood pressure like feeling dizzy or lightheaded, or if you develop extreme fatigue.  Side effects that usually do not require medical attention (Report these to your doctor or health care professional if they continue or are bothersome.):  · cough  What may interact with this medicine?  Do not take this medicine with any of the following medicines:  · aliskiren if you have diabetes  · angiotensin-converting enzyme (ACE) inhibitors, like benazepril, captopril, enalapril, fosinopril, lisinopril, or ramipril  This medicine may also interact with the following medicines:  · angiotensin II receptor blockers (ARBs) like  azilsartan, candesartan, eprosartan, irbesartan, losartan, olmesartan, telmisartan, or valsartan  · lithium  · NSAIDS, medicines for pain and inflammation, like ibuprofen or naproxen  · potassium-sparing diuretics like amiloride, spironolactone, and triamterene  · potassium supplements  What if I miss a dose?  If you miss a dose, take it as soon as you can. If it is almost time for next dose, take only that dose. Do not take double or extra doses.  Where should I keep my medicine?  Keep out of the reach of children.  Store at room temperature between 15 and 30 degrees C (59 and 86 degrees F). Throw away any unused medicine after the expiration date.  What should I tell my health care provider before I take this medicine?  They need to know if you have any of these conditions:  · diabetes and take a medicine that contains aliskiren  · kidney disease  · liver disease  · an unusual or allergic reaction to sacubitril; valsartan, drugs called angiotensin converting enzyme (ACE) inhibitors, angiotensin II receptor blockers (ARBs), other medicines, foods, dyes, or preservatives  · pregnant or trying to get pregnant  · breast-feeding  What should I watch for while using this medicine?  Tell your doctor or healthcare professional if your symptoms do not start to get better or if they get worse.  Do not become pregnant while taking this medicine. Women should inform their doctor if they wish to become pregnant or think they might be pregnant. There is a potential for serious side effects to an unborn child. Talk to your health care professional or pharmacist for more information.  You may get dizzy. Do not drive, use machinery, or do anything that needs mental alertness until you know how this medicine affects you. Do not stand or sit up quickly, especially if you are an older patient. This reduces the risk of dizzy or fainting spells. Avoid alcoholic drinks; they can make you more dizzy.  NOTE:This sheet is a summary. It may  not cover all possible information. If you have questions about this medicine, talk to your doctor, pharmacist, or health care provider. Copyright© 2017 [employer]

## 2018-10-25 NOTE — PROGRESS NOTES
Saw pt in clinic per Dr. Rosa. Pt given outside lab order form for bmp/bnp at [facility] for 11/08/2018. Lab order also faxed to [facility]. Pt scheduled for 3 mo f/u with labs and 6mwt.

## 2018-10-25 NOTE — ASSESSMENT & PLAN NOTE
Will keep on coreg  3.125 BID (was taking 6.25 once a day)  / ASA 81 / plavix 75 / lasix 20 qd / spironlactone 25 qd  Increase lisinopril to 10 mg qhs with BMP in two weeks locally

## 2018-10-27 PROBLEM — F17.200 SMOKER: Status: ACTIVE | Noted: 2018-10-27

## 2018-10-27 NOTE — PROGRESS NOTES
"Subjective: class 3     Patient ID:  Osei Castorena is a 53 y.o. male who presents for follow-up of Heart Transplant Pre-evaluation      HPI   Stage D ICMP w/ Class 3 HFrEF (6/20 EF <10%, LVEDD 5.9) who comes after ICD placement 9/26/2018 who was seen by social work as part of his transplant risk stratification. He was found to be unsuitable at this time for LVAD / OHT (See there note)   Today he reports he has lost his job / truck was repossessed. Gets his food from local food bank  Separately gets FRENCH after 1 to 1.5 blocks  No edema today with nor real change in clinic weight.  Continues to smoke  Slight dizziness occurs two to three time / week when he stands up quickly.   Reports taking 6.25 mg coreg in am instead of 3.125 BID for convenience    Review of Systems   Constitution: Negative for decreased appetite, weight gain and weight loss.   Cardiovascular: Negative for chest pain, dyspnea on exertion, leg swelling, near-syncope, orthopnea and palpitations.   Respiratory: Negative for cough and shortness of breath.    Musculoskeletal: Negative for myalgias.   Gastrointestinal: Negative for jaundice.        Objective:    Physical Exam   Constitutional: He is oriented to person, place, and time. He appears well-developed and well-nourished. He is active. He is not intubated.   BP (!) 90/53 (BP Location: Left arm, Patient Position: Sitting, BP Method: Medium (Automatic))   Pulse 66   Ht 5' 5" (1.651 m)   Wt 53.9 kg (118 lb 13.3 oz)   BMI 19.77 kg/m²      HENT:   Head: Normocephalic and atraumatic. Hair is normal.   Right Ear: External ear normal.   Left Ear: External ear normal.   Nose: Nose normal. No nasal deformity. No epistaxis.  No foreign bodies.   Mouth/Throat: Mucous membranes are normal. Mucous membranes are not cyanotic. No oropharyngeal exudate.   Eyes: Conjunctivae and EOM are normal. Pupils are equal, round, and reactive to light.   Neck: Neck supple. No hepatojugular reflux and no JVD present. "   Cardiovascular: Normal rate, regular rhythm, normal heart sounds and normal pulses. Exam reveals no gallop.   Pulmonary/Chest: Effort normal and breath sounds normal. No apnea and no tachypnea. He is not intubated. No respiratory distress. He exhibits no tenderness.   Abdominal: Soft. Normal appearance and bowel sounds are normal. There is no tenderness. No hernia.   Musculoskeletal: Normal range of motion.   Neurological: He is alert and oriented to person, place, and time. He displays no seizure activity.   Skin: Skin is warm, dry and intact. No rash noted. No pallor.   Psychiatric: He has a normal mood and affect. His speech is normal and behavior is normal. Thought content normal. Cognition and memory are normal.     BMP  Lab Results   Component Value Date     10/25/2018    K 4.5 10/25/2018     10/25/2018    CO2 28 10/25/2018    BUN 9 10/25/2018    CREATININE 0.9 10/25/2018    CALCIUM 9.7 10/25/2018    ANIONGAP 6 (L) 10/25/2018    ESTGFRAFRICA >60.0 10/25/2018    EGFRNONAA >60.0 10/25/2018           Assessment:       1. Chronic combined systolic and diastolic heart failure    2. Ischemic cardiomyopathy    3. NOT a LVAD / Heart transplant candidate    4. Smoker         Plan:       NOT a LVAD / Heart transplant candidate  OHT continued smoking   LVAD - small LV ( < 6 cm) and finances (out of job - no money)    Ischemic cardiomyopathy  Will keep on coreg  3.125 BID (was taking 6.25 once a day)  / ASA 81 / plavix 75 / lasix 20 qd / spironlactone 25 qd  Increase lisinopril to 10 mg qhs with BMP in two weeks locally     Smoker  Ambulatory consult to smoking cessation      Follow-up in about 3 months (around 1/25/2019).    Orders Placed This Encounter   Procedures    Basic metabolic panel    Brain natriuretic peptide    Ambulatory referral to Smoking Cessation Program    Stress test, pulmonary

## 2018-10-29 ENCOUNTER — TELEPHONE (OUTPATIENT)
Dept: TRANSPLANT | Facility: CLINIC | Age: 53
End: 2018-10-29

## 2018-10-29 NOTE — TELEPHONE ENCOUNTER
Per Dr. Rosa, care of patient to be transferred to CHF section from preht section as:    NOT a LVAD / Heart transplant candidate  OHT continued smoking   LVAD - small LV ( < 6 cm) and finances (out of job - no money)    Pt is not on home inotropes; has ICD.  F/U appts are scheduled for 1/29/17 with HP, lab and 6MW.  HF nurses notified electronically; phone review updated.

## 2019-01-09 DIAGNOSIS — Z95.810 ICD (IMPLANTABLE CARDIOVERTER-DEFIBRILLATOR) IN PLACE: Primary | ICD-10-CM

## 2019-01-09 DIAGNOSIS — I25.5 ISCHEMIC CARDIOMYOPATHY: ICD-10-CM

## 2019-01-10 ENCOUNTER — CLINICAL SUPPORT (OUTPATIENT)
Dept: CARDIOLOGY | Facility: HOSPITAL | Age: 54
DRG: 287 | End: 2019-01-10
Payer: MEDICAID

## 2019-01-10 ENCOUNTER — HOSPITAL ENCOUNTER (OUTPATIENT)
Dept: CARDIOLOGY | Facility: CLINIC | Age: 54
Discharge: HOME | DRG: 287 | End: 2019-01-10
Payer: MEDICAID

## 2019-01-10 ENCOUNTER — HOSPITAL ENCOUNTER (INPATIENT)
Facility: HOSPITAL | Age: 54
LOS: 2 days | Discharge: HOME | DRG: 287 | End: 2019-01-12
Attending: EMERGENCY MEDICINE | Admitting: HOSPITALIST
Payer: MEDICAID

## 2019-01-10 ENCOUNTER — OFFICE VISIT (OUTPATIENT)
Dept: ELECTROPHYSIOLOGY | Facility: CLINIC | Age: 54
DRG: 287 | End: 2019-01-10
Payer: MEDICAID

## 2019-01-10 VITALS
WEIGHT: 128 LBS | DIASTOLIC BLOOD PRESSURE: 57 MMHG | HEART RATE: 65 BPM | SYSTOLIC BLOOD PRESSURE: 87 MMHG | HEIGHT: 65 IN | BODY MASS INDEX: 21.33 KG/M2

## 2019-01-10 DIAGNOSIS — Z76.82 HEART TRANSPLANT CANDIDATE: ICD-10-CM

## 2019-01-10 DIAGNOSIS — I21.4 NSTEMI (NON-ST ELEVATION MYOCARDIAL INFARCTION): ICD-10-CM

## 2019-01-10 DIAGNOSIS — Z95.810 ICD (IMPLANTABLE CARDIOVERTER-DEFIBRILLATOR) IN PLACE: ICD-10-CM

## 2019-01-10 DIAGNOSIS — I50.42 CHRONIC COMBINED SYSTOLIC AND DIASTOLIC HEART FAILURE: ICD-10-CM

## 2019-01-10 DIAGNOSIS — R07.9 CHEST PAIN: ICD-10-CM

## 2019-01-10 DIAGNOSIS — Z95.820 S/P ANGIOPLASTY WITH STENT: ICD-10-CM

## 2019-01-10 DIAGNOSIS — I25.10 CORONARY ARTERY DISEASE INVOLVING NATIVE CORONARY ARTERY OF NATIVE HEART WITHOUT ANGINA PECTORIS: ICD-10-CM

## 2019-01-10 DIAGNOSIS — I25.5 ISCHEMIC CARDIOMYOPATHY: ICD-10-CM

## 2019-01-10 DIAGNOSIS — F17.200 SMOKER: ICD-10-CM

## 2019-01-10 DIAGNOSIS — Z95.810 BIVENTRICULAR ICD (IMPLANTABLE CARDIOVERTER-DEFIBRILLATOR) IN PLACE: ICD-10-CM

## 2019-01-10 DIAGNOSIS — I21.4 NON-ST ELEVATION MYOCARDIAL INFARCTION (NSTEMI): ICD-10-CM

## 2019-01-10 DIAGNOSIS — I20.0 UNSTABLE ANGINA: Primary | ICD-10-CM

## 2019-01-10 DIAGNOSIS — I49.9 ABNORMAL HEART RHYTHM: ICD-10-CM

## 2019-01-10 DIAGNOSIS — Z95.810 BIVENTRICULAR ICD (IMPLANTABLE CARDIOVERTER-DEFIBRILLATOR) IN PLACE: Primary | ICD-10-CM

## 2019-01-10 DIAGNOSIS — I45.10 RBBB: ICD-10-CM

## 2019-01-10 LAB
ABO + RH BLD: NORMAL
ALBUMIN SERPL BCP-MCNC: 3.6 G/DL
ALP SERPL-CCNC: 118 U/L
ALT SERPL W/O P-5'-P-CCNC: 6 U/L
ANION GAP SERPL CALC-SCNC: 7 MMOL/L
APTT BLDCRRT: 40.2 SEC
AST SERPL-CCNC: 14 U/L
BASOPHILS # BLD AUTO: 0.1 K/UL
BASOPHILS NFR BLD: 1.6 %
BILIRUB SERPL-MCNC: 0.2 MG/DL
BLD GP AB SCN CELLS X3 SERPL QL: NORMAL
BLOOD GROUP ANTIBODIES SERPL: NORMAL
BUN SERPL-MCNC: 7 MG/DL
CALCIUM SERPL-MCNC: 9.4 MG/DL
CHLORIDE SERPL-SCNC: 101 MMOL/L
CHOLEST SERPL-MCNC: 360 MG/DL
CHOLEST/HDLC SERPL: 9.7 {RATIO}
CO2 SERPL-SCNC: 29 MMOL/L
CREAT SERPL-MCNC: 0.9 MG/DL
DIFFERENTIAL METHOD: ABNORMAL
EOSINOPHIL # BLD AUTO: 0.4 K/UL
EOSINOPHIL NFR BLD: 6.7 %
ERYTHROCYTE [DISTWIDTH] IN BLOOD BY AUTOMATED COUNT: 12.7 %
EST. GFR  (AFRICAN AMERICAN): >60 ML/MIN/1.73 M^2
EST. GFR  (NON AFRICAN AMERICAN): >60 ML/MIN/1.73 M^2
GLUCOSE SERPL-MCNC: 88 MG/DL
HCT VFR BLD AUTO: 46.5 %
HDLC SERPL-MCNC: 37 MG/DL
HDLC SERPL: 10.3 %
HGB BLD-MCNC: 15.1 G/DL
IMM GRANULOCYTES # BLD AUTO: 0.01 K/UL
IMM GRANULOCYTES NFR BLD AUTO: 0.2 %
INR PPP: 1
LDLC SERPL CALC-MCNC: 301.4 MG/DL
LYMPHOCYTES # BLD AUTO: 2.1 K/UL
LYMPHOCYTES NFR BLD: 34.6 %
MCH RBC QN AUTO: 28.9 PG
MCHC RBC AUTO-ENTMCNC: 32.5 G/DL
MCV RBC AUTO: 89 FL
MONOCYTES # BLD AUTO: 0.7 K/UL
MONOCYTES NFR BLD: 11.5 %
NEUTROPHILS # BLD AUTO: 2.8 K/UL
NEUTROPHILS NFR BLD: 45.4 %
NONHDLC SERPL-MCNC: 323 MG/DL
NRBC BLD-RTO: 0 /100 WBC
PLATELET # BLD AUTO: 372 K/UL
PMV BLD AUTO: 8.7 FL
POTASSIUM SERPL-SCNC: 4.1 MMOL/L
PROT SERPL-MCNC: 8 G/DL
PROTHROMBIN TIME: 10.1 SEC
RBC # BLD AUTO: 5.22 M/UL
SODIUM SERPL-SCNC: 137 MMOL/L
TRIGL SERPL-MCNC: 108 MG/DL
TROPONIN I SERPL DL<=0.01 NG/ML-MCNC: 0.02 NG/ML
WBC # BLD AUTO: 6.1 K/UL

## 2019-01-10 PROCEDURE — 20600001 HC STEP DOWN PRIVATE ROOM

## 2019-01-10 PROCEDURE — 93284 PRGRMG EVAL IMPLANTABLE DFB: CPT

## 2019-01-10 PROCEDURE — 93010 ELECTROCARDIOGRAM REPORT: CPT | Mod: ,,, | Performed by: INTERNAL MEDICINE

## 2019-01-10 PROCEDURE — 93010 ELECTROCARDIOGRAM REPORT: CPT | Mod: 76,,, | Performed by: INTERNAL MEDICINE

## 2019-01-10 PROCEDURE — 83036 HEMOGLOBIN GLYCOSYLATED A1C: CPT

## 2019-01-10 PROCEDURE — 84484 ASSAY OF TROPONIN QUANT: CPT

## 2019-01-10 PROCEDURE — 93005 ELECTROCARDIOGRAM TRACING: CPT

## 2019-01-10 PROCEDURE — 25000003 PHARM REV CODE 250: Performed by: HOSPITALIST

## 2019-01-10 PROCEDURE — 80053 COMPREHEN METABOLIC PANEL: CPT

## 2019-01-10 PROCEDURE — 99222 1ST HOSP IP/OBS MODERATE 55: CPT | Mod: ,,, | Performed by: HOSPITALIST

## 2019-01-10 PROCEDURE — 63600175 PHARM REV CODE 636 W HCPCS: Performed by: STUDENT IN AN ORGANIZED HEALTH CARE EDUCATION/TRAINING PROGRAM

## 2019-01-10 PROCEDURE — 99999 PR PBB SHADOW E&M-EST. PATIENT-LVL III: ICD-10-PCS | Mod: PBBFAC,,, | Performed by: NURSE PRACTITIONER

## 2019-01-10 PROCEDURE — 80061 LIPID PANEL: CPT

## 2019-01-10 PROCEDURE — 99214 PR OFFICE/OUTPT VISIT, EST, LEVL IV, 30-39 MIN: ICD-10-PCS | Mod: S$PBB,,, | Performed by: NURSE PRACTITIONER

## 2019-01-10 PROCEDURE — 36415 COLL VENOUS BLD VENIPUNCTURE: CPT

## 2019-01-10 PROCEDURE — 86901 BLOOD TYPING SEROLOGIC RH(D): CPT

## 2019-01-10 PROCEDURE — 99999 PR PBB SHADOW E&M-EST. PATIENT-LVL III: CPT | Mod: PBBFAC,,, | Performed by: NURSE PRACTITIONER

## 2019-01-10 PROCEDURE — 25000003 PHARM REV CODE 250: Performed by: STUDENT IN AN ORGANIZED HEALTH CARE EDUCATION/TRAINING PROGRAM

## 2019-01-10 PROCEDURE — 99222 PR INITIAL HOSPITAL CARE,LEVL II: ICD-10-PCS | Mod: ,,, | Performed by: HOSPITALIST

## 2019-01-10 PROCEDURE — 99214 OFFICE O/P EST MOD 30 MIN: CPT | Mod: S$PBB,,, | Performed by: NURSE PRACTITIONER

## 2019-01-10 PROCEDURE — 85025 COMPLETE CBC W/AUTO DIFF WBC: CPT

## 2019-01-10 PROCEDURE — 99285 EMERGENCY DEPT VISIT HI MDM: CPT | Mod: 25,27

## 2019-01-10 PROCEDURE — 93284 PRGRMG EVAL IMPLANTABLE DFB: CPT | Mod: 26,,, | Performed by: INTERNAL MEDICINE

## 2019-01-10 PROCEDURE — 85730 THROMBOPLASTIN TIME PARTIAL: CPT

## 2019-01-10 PROCEDURE — 99213 OFFICE O/P EST LOW 20 MIN: CPT | Mod: PBBFAC,25 | Performed by: NURSE PRACTITIONER

## 2019-01-10 PROCEDURE — 93284 CARDIAC DEVICE CHECK - IN CLINIC & HOSPITAL: ICD-10-PCS | Mod: 26,,, | Performed by: INTERNAL MEDICINE

## 2019-01-10 PROCEDURE — 99285 PR EMERGENCY DEPT VISIT,LEVEL V: ICD-10-PCS | Mod: ,,, | Performed by: EMERGENCY MEDICINE

## 2019-01-10 PROCEDURE — 93010 EKG 12-LEAD: ICD-10-PCS | Mod: 76,,, | Performed by: INTERNAL MEDICINE

## 2019-01-10 PROCEDURE — 96374 THER/PROPH/DIAG INJ IV PUSH: CPT

## 2019-01-10 PROCEDURE — 99285 EMERGENCY DEPT VISIT HI MDM: CPT | Mod: ,,, | Performed by: EMERGENCY MEDICINE

## 2019-01-10 PROCEDURE — 86870 RBC ANTIBODY IDENTIFICATION: CPT

## 2019-01-10 PROCEDURE — 85610 PROTHROMBIN TIME: CPT

## 2019-01-10 RX ORDER — ATORVASTATIN CALCIUM 20 MG/1
80 TABLET, FILM COATED ORAL DAILY
Status: DISCONTINUED | OUTPATIENT
Start: 2019-01-10 | End: 2019-01-10

## 2019-01-10 RX ORDER — RAMELTEON 8 MG/1
8 TABLET ORAL NIGHTLY PRN
Status: DISCONTINUED | OUTPATIENT
Start: 2019-01-10 | End: 2019-01-12 | Stop reason: HOSPADM

## 2019-01-10 RX ORDER — CLOPIDOGREL 300 MG/1
600 TABLET, FILM COATED ORAL ONCE
Status: COMPLETED | OUTPATIENT
Start: 2019-01-10 | End: 2019-01-10

## 2019-01-10 RX ORDER — DIPHENHYDRAMINE HCL 50 MG
50 CAPSULE ORAL
Status: ACTIVE | OUTPATIENT
Start: 2019-01-10 | End: 2019-01-11

## 2019-01-10 RX ORDER — CARVEDILOL 3.12 MG/1
3.12 TABLET ORAL 2 TIMES DAILY
Status: DISCONTINUED | OUTPATIENT
Start: 2019-01-10 | End: 2019-01-12

## 2019-01-10 RX ORDER — ATORVASTATIN CALCIUM 20 MG/1
80 TABLET, FILM COATED ORAL DAILY
Status: DISCONTINUED | OUTPATIENT
Start: 2019-01-11 | End: 2019-01-12 | Stop reason: HOSPADM

## 2019-01-10 RX ORDER — NITROGLYCERIN 0.4 MG/1
0.4 TABLET SUBLINGUAL EVERY 5 MIN PRN
Status: DISCONTINUED | OUTPATIENT
Start: 2019-01-10 | End: 2019-01-12 | Stop reason: HOSPADM

## 2019-01-10 RX ORDER — FUROSEMIDE 20 MG/1
20 TABLET ORAL DAILY
Status: DISCONTINUED | OUTPATIENT
Start: 2019-01-11 | End: 2019-01-12 | Stop reason: HOSPADM

## 2019-01-10 RX ORDER — HEPARIN SODIUM 10000 [USP'U]/100ML
60 INJECTION, SOLUTION INTRAVENOUS ONCE
Status: COMPLETED | OUTPATIENT
Start: 2019-01-10 | End: 2019-01-10

## 2019-01-10 RX ORDER — NITROGLYCERIN 0.4 MG/1
0.4 TABLET SUBLINGUAL EVERY 5 MIN PRN
Status: DISCONTINUED | OUTPATIENT
Start: 2019-01-10 | End: 2019-01-10

## 2019-01-10 RX ORDER — LISINOPRIL 10 MG/1
10 TABLET ORAL DAILY
Status: DISCONTINUED | OUTPATIENT
Start: 2019-01-11 | End: 2019-01-11

## 2019-01-10 RX ORDER — NAPROXEN SODIUM 220 MG/1
324 TABLET, FILM COATED ORAL ONCE
Status: COMPLETED | OUTPATIENT
Start: 2019-01-10 | End: 2019-01-10

## 2019-01-10 RX ORDER — HEPARIN SODIUM,PORCINE/D5W 25000/250
12 INTRAVENOUS SOLUTION INTRAVENOUS CONTINUOUS
Status: DISCONTINUED | OUTPATIENT
Start: 2019-01-10 | End: 2019-01-11

## 2019-01-10 RX ORDER — CLOPIDOGREL BISULFATE 75 MG/1
75 TABLET ORAL DAILY
Status: DISCONTINUED | OUTPATIENT
Start: 2019-01-11 | End: 2019-01-10

## 2019-01-10 RX ORDER — DIPHENHYDRAMINE HCL 25 MG
25 CAPSULE ORAL NIGHTLY PRN
Status: DISCONTINUED | OUTPATIENT
Start: 2019-01-10 | End: 2019-01-12 | Stop reason: HOSPADM

## 2019-01-10 RX ORDER — HEPARIN SODIUM 10000 [USP'U]/100ML
12 INJECTION, SOLUTION INTRAVENOUS CONTINUOUS
Status: DISCONTINUED | OUTPATIENT
Start: 2019-01-10 | End: 2019-01-11 | Stop reason: SDUPTHER

## 2019-01-10 RX ORDER — NAPROXEN SODIUM 220 MG/1
TABLET, FILM COATED ORAL
Status: DISPENSED
Start: 2019-01-10 | End: 2019-01-11

## 2019-01-10 RX ORDER — NAPROXEN SODIUM 220 MG/1
81 TABLET, FILM COATED ORAL DAILY
Status: DISCONTINUED | OUTPATIENT
Start: 2019-01-11 | End: 2019-01-10

## 2019-01-10 RX ORDER — SPIRONOLACTONE 25 MG/1
25 TABLET ORAL DAILY
Status: DISCONTINUED | OUTPATIENT
Start: 2019-01-11 | End: 2019-01-12 | Stop reason: HOSPADM

## 2019-01-10 RX ORDER — NAPROXEN SODIUM 220 MG/1
81 TABLET, FILM COATED ORAL DAILY
Status: DISCONTINUED | OUTPATIENT
Start: 2019-01-11 | End: 2019-01-12 | Stop reason: HOSPADM

## 2019-01-10 RX ORDER — CLOPIDOGREL BISULFATE 75 MG/1
75 TABLET ORAL DAILY
Status: DISCONTINUED | OUTPATIENT
Start: 2019-01-11 | End: 2019-01-12 | Stop reason: HOSPADM

## 2019-01-10 RX ADMIN — DIPHENHYDRAMINE HYDROCHLORIDE 25 MG: 25 CAPSULE ORAL at 09:01

## 2019-01-10 RX ADMIN — HEPARIN SODIUM AND DEXTROSE 3490 UNITS: 10000; 5 INJECTION INTRAVENOUS at 05:01

## 2019-01-10 RX ADMIN — CARVEDILOL 3.12 MG: 3.12 TABLET, FILM COATED ORAL at 09:01

## 2019-01-10 RX ADMIN — ATORVASTATIN CALCIUM 80 MG: 20 TABLET, FILM COATED ORAL at 05:01

## 2019-01-10 RX ADMIN — CLOPIDOGREL BISULFATE 600 MG: 300 TABLET, FILM COATED ORAL at 05:01

## 2019-01-10 RX ADMIN — HEPARIN SODIUM AND DEXTROSE 12 UNITS/KG/HR: 10000; 5 INJECTION INTRAVENOUS at 05:01

## 2019-01-10 RX ADMIN — ASPIRIN 81 MG 324 MG: 81 TABLET ORAL at 05:01

## 2019-01-10 NOTE — HPI
"Mr. Castorena is a 53 year old male with PMH ischemic cardiomyopathy s/p PCI x 5 in 12/17 at [facility] in [facility] (anatomy unknown, patient was never given an information card), EF < 10% despite GDMT s/p CRT-D, tobacco abuse who was sent to the ER from clinic for intermittent chest pain. He reports intermittent chest pain that has been occurring over the past month, which is subxiphoid and beneath his left arm, occurs at rest, not associated with exertion, lasting anywhere from an hour to an entire day.     When he was initially revascularized he had no symptoms of angina; he had developed heart failure symptoms a week before he went to the hospital and had significant NYHA III-IV FRENCH, orthopnea and PND. As of late he has had symptoms of FRENCH and although he sleeps on one pillow he does have orthopnea as it is "difficult to sleep that way". He has not had peripheral edema. He continues to smoke, denies illicit substance use. He reports variable compliance with his medications due to unpleasant side effects, specifically, although he took his aspirin daily, he rarely took his plavix. He has had frequent palpitations but no syncope.    "

## 2019-01-10 NOTE — ED NOTES
Pt identifiers Osei Castorena were checked and correct  LOC: The patient is awake, alert, aware of environment with an appropriate affect. Oriented x3, speaking appropriately  APPEARANCE: Pt resting comfortably, in no acute distress, pt is clean and well groomed, clothing properly fastened  SKIN: Skin warm, dry and intact, normal skin turgor, moist mucus membranes  RESPIRATORY: Airway is open and patent, respirations are spontaneous, even and unlabored, normal effort and rate  CARDIAC: Normal rate and rhythm, no peripheral edema noted, capillary refill < 3 seconds, bilateral radial pulses 2+, pt c/o chest pressure x 1-2 months.   ABDOMEN: Soft, non tender, non distended. Bowel sounds present x 4 quadrants.   NEUROLOGIC: PERRL, facial expression is symmetrical, patient moving all extremities spontaneously, normal sensation in all extremities when touched with a finger.  Follows all commands appropriately  MUSCULOSKELETAL: No obvious deformities.

## 2019-01-10 NOTE — Clinical Note
40 ml injected throughout the case. 35 mL total wasted during the case. 75 mL total used in the case.

## 2019-01-10 NOTE — SUBJECTIVE & OBJECTIVE
Past Medical History:   Diagnosis Date    CHF (congestive heart failure)     Hyperlipidemia     Myocardial infarction        Past Surgical History:   Procedure Laterality Date    APPENDECTOMY      HEART CATH-RIGHT Right 5/22/2018    Performed by Tunde Hernadez MD at [facility] CATH LAB    INSERTION, ICD, BIVENTRICULAR N/A 9/28/2018    Performed by Daniel Chand MD at [facility] CATH LAB       Review of patient's allergies indicates:  No Known Allergies      (Not in a hospital admission)  Family History     None        Tobacco Use    Smoking status: Current Some Day Smoker     Packs/day: 0.50    Smokeless tobacco: Never Used   Substance and Sexual Activity    Alcohol use: No    Drug use: Not on file    Sexual activity: Not on file     Review of Systems   Constitution: Negative.   HENT: Negative.    Eyes: Negative.    Cardiovascular: Positive for chest pain, dyspnea on exertion, irregular heartbeat, orthopnea and palpitations. Negative for leg swelling, near-syncope, paroxysmal nocturnal dyspnea and syncope.   Respiratory: Positive for cough and shortness of breath.    Endocrine: Negative.    Hematologic/Lymphatic: Negative.    Skin: Negative.    Musculoskeletal: Negative.    Gastrointestinal: Negative.    Genitourinary: Negative.    Neurological: Negative.    Psychiatric/Behavioral: Negative.      Objective:     Vital Signs (Most Recent):  Temp: 98.3 °F (36.8 °C) (01/10/19 1603)  Pulse: 66 (01/10/19 1631)  Resp: 18 (01/10/19 1603)  BP: 111/69 (01/10/19 1631)  SpO2: 99 % (01/10/19 1631) Vital Signs (24h Range):  Temp:  [98.3 °F (36.8 °C)] 98.3 °F (36.8 °C)  Pulse:  [65-67] 66  Resp:  [18] 18  SpO2:  [95 %-99 %] 99 %  BP: ()/(57-72) 111/69     Weight: 58.1 kg (128 lb)  Body mass index is 21.3 kg/m².    SpO2: 99 %       No intake or output data in the 24 hours ending 01/10/19 1703    Lines/Drains/Airways     Peripheral Intravenous Line                 Peripheral IV - Single Lumen 01/10/19 1621 Left Antecubital  less than 1 day         Peripheral IV - Single Lumen 01/10/19 1622 Right Hand less than 1 day                Physical Exam   Constitutional: He is oriented to person, place, and time. He appears well-developed and well-nourished. No distress.   HENT:   Head: Normocephalic and atraumatic.   Neck: No JVD present.   Cardiovascular: Normal rate, regular rhythm, normal heart sounds and intact distal pulses. Exam reveals no gallop and no friction rub.   No murmur heard.  Pulses:       Radial pulses are 2+ on the right side, and 2+ on the left side.        Femoral pulses are 2+ on the right side, and 2+ on the left side.       Dorsalis pedis pulses are 2+ on the right side, and 2+ on the left side.        Posterior tibial pulses are 0 on the right side, and 0 on the left side.   Pulmonary/Chest: Effort normal. No respiratory distress. He has no wheezes. He has no rales.   Abdominal: Soft. Bowel sounds are normal. He exhibits no distension. There is no tenderness.   Musculoskeletal: He exhibits no edema.   Neurological: He is alert and oriented to person, place, and time.   Skin: He is not diaphoretic.       Significant Labs:     Recent Results (from the past 24 hour(s))   CBC auto differential    Collection Time: 01/10/19  4:20 PM   Result Value Ref Range    WBC 6.10 3.90 - 12.70 K/uL    RBC 5.22 4.60 - 6.20 M/uL    Hemoglobin 15.1 14.0 - 18.0 g/dL    Hematocrit 46.5 40.0 - 54.0 %    MCV 89 82 - 98 fL    MCH 28.9 27.0 - 31.0 pg    MCHC 32.5 32.0 - 36.0 g/dL    RDW 12.7 11.5 - 14.5 %    Platelets 372 (H) 150 - 350 K/uL    MPV 8.7 (L) 9.2 - 12.9 fL    Immature Granulocytes 0.2 0.0 - 0.5 %    Gran # (ANC) 2.8 1.8 - 7.7 K/uL    Immature Grans (Abs) 0.01 0.00 - 0.04 K/uL    Lymph # 2.1 1.0 - 4.8 K/uL    Mono # 0.7 0.3 - 1.0 K/uL    Eos # 0.4 0.0 - 0.5 K/uL    Baso # 0.10 0.00 - 0.20 K/uL    nRBC 0 0 /100 WBC    Gran% 45.4 38.0 - 73.0 %    Lymph% 34.6 18.0 - 48.0 %    Mono% 11.5 4.0 - 15.0 %    Eosinophil% 6.7 0.0 - 8.0 %     Basophil% 1.6 0.0 - 1.9 %    Differential Method Automated    Comprehensive metabolic panel    Collection Time: 01/10/19  4:20 PM   Result Value Ref Range    Sodium 137 136 - 145 mmol/L    Potassium 4.1 3.5 - 5.1 mmol/L    Chloride 101 95 - 110 mmol/L    CO2 29 23 - 29 mmol/L    Glucose 88 70 - 110 mg/dL    BUN, Bld 7 6 - 20 mg/dL    Creatinine 0.9 0.5 - 1.4 mg/dL    Calcium 9.4 8.7 - 10.5 mg/dL    Total Protein 8.0 6.0 - 8.4 g/dL    Albumin 3.6 3.5 - 5.2 g/dL    Total Bilirubin 0.2 0.1 - 1.0 mg/dL    Alkaline Phosphatase 118 55 - 135 U/L    AST 14 10 - 40 U/L    ALT 6 (L) 10 - 44 U/L    Anion Gap 7 (L) 8 - 16 mmol/L    eGFR if African American >60.0 >60 mL/min/1.73 m^2    eGFR if non African American >60.0 >60 mL/min/1.73 m^2   Protime-INR    Collection Time: 01/10/19  4:20 PM   Result Value Ref Range    Prothrombin Time 10.1 9.0 - 12.5 sec    INR 1.0 0.8 - 1.2   Troponin I    Collection Time: 01/10/19  4:20 PM   Result Value Ref Range    Troponin I 0.016 0.000 - 0.026 ng/mL   Type & Screen    Collection Time: 01/10/19  4:20 PM   Result Value Ref Range    Group & Rh A POS     Indirect Ezekiel POS    Antibody identification    Collection Time: 01/10/19  4:20 PM   Result Value Ref Range    Antibody Identification POS    APTT    Collection Time: 01/10/19  9:40 PM   Result Value Ref Range    aPTT 40.2 (H) 21.0 - 32.0 sec   Lipid panel    Collection Time: 01/10/19  9:40 PM   Result Value Ref Range    Cholesterol 360 (H) 120 - 199 mg/dL    Triglycerides 108 30 - 150 mg/dL    HDL 37 (L) 40 - 75 mg/dL    LDL Cholesterol 301.4 (H) 63.0 - 159.0 mg/dL    HDL/Chol Ratio 10.3 (L) 20.0 - 50.0 %    Total Cholesterol/HDL Ratio 9.7 (H) 2.0 - 5.0    Non-HDL Cholesterol 323 mg/dL   Hemoglobin A1c    Collection Time: 01/10/19  9:40 PM   Result Value Ref Range    Hemoglobin A1C 5.3 4.0 - 5.6 %    Estimated Avg Glucose 105 68 - 131 mg/dL       Significant Imaging:     I have reviewed all pertinent imaging studies from the last 24  hours

## 2019-01-10 NOTE — ED NOTES
Pt presented to the ED via w/c clinic c/o chest pain for 1-2 months. Pt alert. Pt denies any nausea or vomiting. Pt has a hx of MI.

## 2019-01-10 NOTE — PROGRESS NOTES
Mr. Castorena is a patient of Dr. Chand and was last seen in clinic 8/21/2018.      Subjective:   Patient ID:  Osei Castorena is a 53 y.o. male who presents for follow-up of Pacemaker Check  .     HPI:    Mr. Castorena is a 53 y.o. male with CAD (s/p MI 2017), HTN, ICM (EF<10%) here for follow up after device implantation.    Background:    I had the pleasure of seeing Mr. Castorena today in our electrophysiology clinic in consultation for his cardiomyopathy. As you are aware he is a pleasant 52 year-old  with myocardial infarction in 2017 s/p RCA PCI, hypertension, and severe ischemic cardiomyopathy with LVEF <10% with NYHA class III symptoms despite revascularization and max tolerated medical therapy with beta-blocker/lisinopril for greater than 3 months. He is wearing a lifevest. He has never been shocked. He has cancelled several alarms that he was told were from artifact. He continues to work as a . He notes no syncope. He does have occasional episodes of sustained palpitations.    Update (01/10/2019):    On 9/28/2018 he underwent successful CRT-D implantation.    Today he says that he feels two types of chest pain, under his left armpit which comes and goes, and, for the past week or so, a worsening midsternal chest pressure that is similar to his previous ischemic pain. He is also reporting worsening FRENCH. He claims that his symptoms are really making him nervous. Denies palps, light-headedness, syncope.    Device Interrogation (1/10/2019) reveals an intrinsic SR with 1st degree AVB with stable lead and device function. NSVTx1, 4 seconds. He paces 0% in the RA and 100% in the BiV. Estimated battery longevity 6-7 years.     I have personally reviewed the patient's EKG today, which shows ASVP at 65bpm. OR interval is 126. QRS is 132. QTc is 451. New T wave inversions noted to inferior leads.    Recent Cardiac Tests:    2D Echo (6/20/2018):  CONCLUSIONS     1 - Eccentric LVH with severely depressed left  ventricular systolic function (EF <10%).     2 - Severe left atrial enlargement.     3 - Impaired LV relaxation, normal LAP (grade 1 diastolic dysfunction).     4 - Right ventricle is upper limit of normal in size with normal systolic function.     5 - Mild to moderate tricuspid regurgitation.     6 - The estimated PA systolic pressure is 30 mmHg.     7 - Mild mitral regurgitation.     No current facility-administered medications for this visit.      Current Outpatient Medications   Medication Sig    aspirin 81 MG Chew Take 1 tablet (81 mg total) by mouth once daily.    atorvastatin (LIPITOR) 80 MG tablet Take 1 tablet (80 mg total) by mouth once daily.    carvedilol (COREG) 3.125 MG tablet Take 1 tablet (3.125 mg total) by mouth 2 (two) times daily.    clopidogrel (PLAVIX) 75 mg tablet Take 1 tablet (75 mg total) by mouth once daily.    furosemide (LASIX) 20 MG tablet Take 1 tablet (20 mg total) by mouth once daily.    lisinopril 10 MG tablet Take 1 tablet (10 mg total) by mouth once daily.    spironolactone (ALDACTONE) 25 MG tablet Take 1 tablet (25 mg total) by mouth once daily.     Facility-Administered Medications Ordered in Other Visits   Medication    diphenhydrAMINE capsule 50 mg     Review of Systems   Constitution: Negative for malaise/fatigue.   Cardiovascular: Positive for chest pain and dyspnea on exertion. Negative for irregular heartbeat, leg swelling and palpitations.   Respiratory: Negative for shortness of breath.    Hematologic/Lymphatic: Negative for bleeding problem.   Skin: Negative for rash.   Musculoskeletal: Negative for myalgias.   Gastrointestinal: Negative for hematemesis, hematochezia and nausea.   Genitourinary: Negative for hematuria.   Neurological: Negative for light-headedness.   Psychiatric/Behavioral: Negative for altered mental status. The patient is nervous/anxious.    Allergic/Immunologic: Negative for persistent infections.     Objective:        BP (!) 87/57   Pulse  "65   Ht 5' 5" (1.651 m)   Wt 58.1 kg (128 lb)   BMI 21.30 kg/m²     Physical Exam   Constitutional: He is oriented to person, place, and time. He appears well-developed and well-nourished.   HENT:   Head: Normocephalic.   Nose: Nose normal.   Eyes: Pupils are equal, round, and reactive to light.   Cardiovascular: Normal rate, regular rhythm, S1 normal and S2 normal.   No murmur heard.  Pulses:       Radial pulses are 2+ on the right side, and 2+ on the left side.   Pulmonary/Chest: Breath sounds normal. No respiratory distress.   Device to LUCW.   Abdominal: Normal appearance.   Musculoskeletal: Normal range of motion. He exhibits no edema.   Neurological: He is alert and oriented to person, place, and time.   Skin: Skin is warm and dry. No erythema.   Psychiatric: He has a normal mood and affect. His speech is normal and behavior is normal.   Nursing note and vitals reviewed.    Lab Results   Component Value Date     10/25/2018    K 4.5 10/25/2018    BUN 9 10/25/2018    CREATININE 0.9 10/25/2018    ALT 16 06/20/2018    AST 17 06/20/2018    HGB 12.8 (L) 09/28/2018    HCT 39.0 (L) 09/28/2018    TSH 1.029 05/14/2018       Recent Labs   Lab 05/22/18  0655 09/28/18  1049   INR 1.0 1.0       Assessment:     1. Biventricular ICD (implantable cardioverter-defibrillator) in place    2. Ischemic cardiomyopathy    3. RBBB      Plan:     In summary, Mr. Castorena is a 53 y.o. male with CAD (s/p MI 2017), HTN, ICM (EF<10%) here for follow up after device implantation.  While his device is functioning well, with 99% biventricular pacing, he is having symptoms that are very concerning for stent restenosis.   He may also have some fluid overload as well. Will bring to ED immediately.    Transfer to ED  Continue device settings.    Addend: Patient brought to ED and report provided to triage.   During transport patient complained of worsening midsternal chest pain.    *A copy of this note has been sent to Dr." Mannie*    Follow-up in about 1 year (around 1/10/2020).    ------------------------------------------------------------------    SHARON Thompson, NP-C  [facility]

## 2019-01-10 NOTE — Clinical Note
Patient had active chest pain similar to his previous ischemic pain and brought to ED. Now admitted.

## 2019-01-10 NOTE — ED PROVIDER NOTES
Encounter Date: 1/10/2019    SCRIBE #1 NOTE: I, Garrett Lugo, am scribing for, and in the presence of,  Dr. Vidales. I have scribed the entire note.       History     Chief Complaint   Patient presents with    Chest Pain     Coming from EP lab with CP, ekg changes, same pain as MI 1 year ago.      Time patient was seen by the provider: 4:17 PM      The patient is a 53 y.o. male with co-morbidities including: HLD, MI, and CHF, who presents to the ED with a complaint of worsening central chest pain for 1 month. Patient reports his pain feels similar to previous MI one year ago. He was in the electrophysiology lab when EKG changes were noted so he was sent to the ED. He reports his last heart cath was completed around a month ago. He has an AICD pacer but is unsure of why it was placed. He denies cardiac stents, nausea, vomiting, diaphoresis, SOB. He notes pain in left arm pit. Patient reports taking aspirin today. Initial EKG in ED showed st elevation in v4-v6 and code STEMI was called.       The history is provided by the patient.     Review of patient's allergies indicates:  No Known Allergies  Past Medical History:   Diagnosis Date    CHF (congestive heart failure)     Hyperlipidemia     Myocardial infarction      Past Surgical History:   Procedure Laterality Date    APPENDECTOMY      HEART CATH-RIGHT Right 5/22/2018    Performed by Tunde Hernadez MD at [facility] CATH LAB    INSERTION, ICD, BIVENTRICULAR N/A 9/28/2018    Performed by Daniel Chand MD at [facility] CATH LAB     No family history on file.  Social History     Tobacco Use    Smoking status: Current Some Day Smoker     Packs/day: 0.50    Smokeless tobacco: Never Used   Substance Use Topics    Alcohol use: No    Drug use: Not on file     Review of Systems   Constitutional: Negative for diaphoresis.   Respiratory: Negative for shortness of breath.    Cardiovascular: Positive for chest pain.   Gastrointestinal: Negative for nausea and vomiting.    Musculoskeletal:        Pain in left arm pit.    All other systems reviewed and are negative.      Physical Exam     Initial Vitals [01/10/19 1603]   BP Pulse Resp Temp SpO2   121/72 67 18 98.3 °F (36.8 °C) 95 %      MAP       --         Physical Exam    Nursing note and vitals reviewed.  Constitutional: He appears well-developed and well-nourished.   HENT:   Head: Normocephalic and atraumatic.   Eyes: Conjunctivae and EOM are normal.   Neck: Normal range of motion. Neck supple.   Cardiovascular: Normal rate, regular rhythm and normal heart sounds.   No murmur heard.  AICD pacer in left chest.    Pulmonary/Chest: Breath sounds normal. No respiratory distress.   Abdominal: Soft. He exhibits no distension. There is no tenderness. There is no rebound and no guarding.   Musculoskeletal: Normal range of motion.   Neurological: He is alert and oriented to person, place, and time. He has normal strength. No sensory deficit.   Skin: No rash noted.   Psychiatric: He has a normal mood and affect. His behavior is normal. Thought content normal.         ED Course   Procedures  Labs Reviewed   CBC W/ AUTO DIFFERENTIAL - Abnormal; Notable for the following components:       Result Value    Platelets 372 (*)     MPV 8.7 (*)     All other components within normal limits   COMPREHENSIVE METABOLIC PANEL - Abnormal; Notable for the following components:    ALT 6 (*)     Anion Gap 7 (*)     All other components within normal limits   PROTIME-INR   TROPONIN I   TYPE & SCREEN   ANTIBODY IDENTIFICATION     EKG Readings: (Independently Interpreted)   Initial Reading: STEMI. Heart Rate: 75 bpm. Conduction: RBBB. ST Segment Elevation: V4, V5 and V6.   Atrial paced rhythm. ST elevations are changed from prior 9/28/2018.        Imaging Results    None          Medical Decision Making:   History:   Old Medical Records: I decided to obtain old medical records.  Old Records Summarized: records from previous admission(s).  Initial Assessment:    Possible STEMI although patient symptoms atypical. Based on EKG code STEMI called and cardiology team notified immediately and takes case.  Labs and x-ray pending  Differential Diagnosis:   STEMI, Unstable Angina, pacemaker causing factitious STEMI  Independently Interpreted Test(s):   I have ordered and independently interpreted EKG Reading(s) - see prior notes  Clinical Tests:   Lab Tests: Reviewed  Medical Tests: Reviewed and Ordered  ED Management:  Patient took ASA today. Cards gives him Brilanta    Pacer turned off by CARDS team and I am told they have repeat EKG with native rhythm that is NO STEMI.  Cards suggests to get W/U back and put in Obs with IM for Unstable angina and cardiac cath tomorrow.  Patient is very, very poor historian and tells CARDS he has 5 cardiac stents.  Other:   I have discussed this case with another health care provider.            Scribe Attestation:   Scribe #1: I performed the above scribed service and the documentation accurately describes the services I performed. I attest to the accuracy of the note.               Clinical Impression:   The primary encounter diagnosis was Unstable angina. Diagnoses of Chest pain and Abnormal heart rhythm were also pertinent to this visit.                             Ye Vidales MD  01/10/19 [id number]

## 2019-01-10 NOTE — PLAN OF CARE
Briefly this is Mr. Castorena who is a 52 y/o gentleman with PMHx CAD (s/p MI 2017), HTN, ICM (EF <10%) who was sent to the ED from EP device clinic after c/o chest pain.     Patient states he has had intermittent chest pain for the past month, similar to his MI chest pain he's had in the past. It occurs spontaneously and can also resolve spontaneously, but he will sometimes use SL NTG. Today he states his chest pain was there earlier, but he was chest pain free in the ED at time of my evaluation without any intervention.    Initial ECG was interpreted as STEMI by ER staff with ST elevations of the lateral precordial leads.  Upon review, patient has a BiV AICD with a paced rhythm, ECG is un-interpretable for STEMI.  Device was interrogated at bedside, and underlying rhythm is a bifascicular block.     Will treat as unstable angina and start ACS protocol (patient is chest pain free now).  ASA, Plavix loaded, heparin gtt.  Admit to IM C/J.  Interventional cardiology consult for angiogram tomorrow.  Discussed with Dr. Vu.    Signed:  Alan Hughes MD  Cardiology Fellow - PGY6  1/10/2019 5:23 PM

## 2019-01-11 LAB
ALBUMIN SERPL BCP-MCNC: 3.2 G/DL
ALBUMIN SERPL BCP-MCNC: 3.2 G/DL
ALP SERPL-CCNC: 107 U/L
ALP SERPL-CCNC: 107 U/L
ALT SERPL W/O P-5'-P-CCNC: 6 U/L
ALT SERPL W/O P-5'-P-CCNC: 6 U/L
ANION GAP SERPL CALC-SCNC: 8 MMOL/L
ANION GAP SERPL CALC-SCNC: 8 MMOL/L
APTT BLDCRRT: 27.6 SEC
APTT BLDCRRT: >150 SEC
ASCENDING AORTA: 2.54 CM
AST SERPL-CCNC: 13 U/L
AST SERPL-CCNC: 14 U/L
AV INDEX (PROSTH): 0.33
AV MEAN GRADIENT: 7.23 MMHG
AV PEAK GRADIENT: 13.69 MMHG
AV VALVE AREA: 1.19 CM2
BASOPHILS # BLD AUTO: 0.13 K/UL
BASOPHILS NFR BLD: 1.9 %
BILIRUB SERPL-MCNC: 0.3 MG/DL
BILIRUB SERPL-MCNC: 0.3 MG/DL
BNP SERPL-MCNC: 199 PG/ML
BSA FOR ECHO PROCEDURE: 1.63 M2
BUN SERPL-MCNC: 8 MG/DL
BUN SERPL-MCNC: 8 MG/DL
CALCIUM SERPL-MCNC: 9.6 MG/DL
CALCIUM SERPL-MCNC: 9.8 MG/DL
CHLORIDE SERPL-SCNC: 103 MMOL/L
CHLORIDE SERPL-SCNC: 103 MMOL/L
CO2 SERPL-SCNC: 25 MMOL/L
CO2 SERPL-SCNC: 27 MMOL/L
CREAT SERPL-MCNC: 0.8 MG/DL
CREAT SERPL-MCNC: 0.8 MG/DL
CV ECHO LV RWT: 0.28 CM
DIFFERENTIAL METHOD: ABNORMAL
DOP CALC AO PEAK VEL: 1.85 M/S
DOP CALC AO VTI: 34.21 CM
DOP CALC LVOT AREA: 3.56 CM2
DOP CALC LVOT DIAMETER: 2.13 CM
DOP CALC LVOT STROKE VOLUME: 40.57 CM3
DOP CALCLVOT PEAK VEL VTI: 11.39 CM
E WAVE DECELERATION TIME: 127.42 MSEC
E/A RATIO: 0.89
E/E' RATIO: 13.4
ECHO LV POSTERIOR WALL: 0.92 CM (ref 0.6–1.1)
EOSINOPHIL # BLD AUTO: 0.3 K/UL
EOSINOPHIL NFR BLD: 4.8 %
ERYTHROCYTE [DISTWIDTH] IN BLOOD BY AUTOMATED COUNT: 12.7 %
EST. GFR  (AFRICAN AMERICAN): >60 ML/MIN/1.73 M^2
EST. GFR  (AFRICAN AMERICAN): >60 ML/MIN/1.73 M^2
EST. GFR  (NON AFRICAN AMERICAN): >60 ML/MIN/1.73 M^2
EST. GFR  (NON AFRICAN AMERICAN): >60 ML/MIN/1.73 M^2
ESTIMATED AVG GLUCOSE: 105 MG/DL
FRACTIONAL SHORTENING: 10 % (ref 28–44)
GLUCOSE SERPL-MCNC: 75 MG/DL
GLUCOSE SERPL-MCNC: 76 MG/DL
HBA1C MFR BLD HPLC: 5.3 %
HCT VFR BLD AUTO: 45 %
HGB BLD-MCNC: 14.7 G/DL
IMM GRANULOCYTES # BLD AUTO: 0.02 K/UL
IMM GRANULOCYTES NFR BLD AUTO: 0.3 %
INTERVENTRICULAR SEPTUM: 0.92 CM (ref 0.6–1.1)
LA MAJOR: 5.06 CM
LA MINOR: 5.38 CM
LA WIDTH: 3.12 CM
LEFT ATRIUM SIZE: 3.09 CM
LEFT ATRIUM VOLUME INDEX: 26.1 ML/M2
LEFT ATRIUM VOLUME: 42.74 CM3
LEFT INTERNAL DIMENSION IN SYSTOLE: 5.84 CM (ref 2.1–4)
LEFT VENTRICLE DIASTOLIC VOLUME INDEX: 130.76 ML/M2
LEFT VENTRICLE DIASTOLIC VOLUME: 213.88 ML
LEFT VENTRICLE MASS INDEX: 154.5 G/M2
LEFT VENTRICLE SYSTOLIC VOLUME INDEX: 103.3 ML/M2
LEFT VENTRICLE SYSTOLIC VOLUME: 168.92 ML
LEFT VENTRICULAR INTERNAL DIMENSION IN DIASTOLE: 6.47 CM (ref 3.5–6)
LEFT VENTRICULAR MASS: 252.71 G
LV LATERAL E/E' RATIO: 13.4
LV SEPTAL E/E' RATIO: 13.4
LYMPHOCYTES # BLD AUTO: 0.7 K/UL
LYMPHOCYTES NFR BLD: 10.6 %
MAGNESIUM SERPL-MCNC: 2.1 MG/DL
MCH RBC QN AUTO: 28.4 PG
MCHC RBC AUTO-ENTMCNC: 32.7 G/DL
MCV RBC AUTO: 87 FL
MONOCYTES # BLD AUTO: 0.6 K/UL
MONOCYTES NFR BLD: 8.7 %
MV PEAK A VEL: 0.75 M/S
MV PEAK E VEL: 0.67 M/S
NEUTROPHILS # BLD AUTO: 4.9 K/UL
NEUTROPHILS NFR BLD: 73.7 %
NRBC BLD-RTO: 0 /100 WBC
PISA TR MAX VEL: 2.3 M/S
PLATELET # BLD AUTO: 342 K/UL
PMV BLD AUTO: 8.9 FL
POTASSIUM SERPL-SCNC: 4 MMOL/L
POTASSIUM SERPL-SCNC: 4 MMOL/L
PROT SERPL-MCNC: 6.9 G/DL
PROT SERPL-MCNC: 7 G/DL
RA MAJOR: 4.56 CM
RA PRESSURE: 8 MMHG
RA WIDTH: 3.69 CM
RBC # BLD AUTO: 5.18 M/UL
RIGHT VENTRICULAR END-DIASTOLIC DIMENSION: 3.69 CM
SINUS: 3.73 CM
SODIUM SERPL-SCNC: 136 MMOL/L
SODIUM SERPL-SCNC: 138 MMOL/L
STJ: 2.34 CM
TDI LATERAL: 0.05
TDI SEPTAL: 0.05
TDI: 0.05
TR MAX PG: 21.16 MMHG
TRICUSPID ANNULAR PLANE SYSTOLIC EXCURSION: 1.47 CM
TROPONIN I SERPL DL<=0.01 NG/ML-MCNC: 0.02 NG/ML
TV REST PULMONARY ARTERY PRESSURE: 29 MMHG
WBC # BLD AUTO: 6.69 K/UL

## 2019-01-11 PROCEDURE — 63600175 PHARM REV CODE 636 W HCPCS: Performed by: INTERNAL MEDICINE

## 2019-01-11 PROCEDURE — 99152 MOD SED SAME PHYS/QHP 5/>YRS: CPT | Mod: ,,, | Performed by: INTERNAL MEDICINE

## 2019-01-11 PROCEDURE — 93010 ELECTROCARDIOGRAM REPORT: CPT | Mod: ,,, | Performed by: INTERNAL MEDICINE

## 2019-01-11 PROCEDURE — 85025 COMPLETE CBC W/AUTO DIFF WBC: CPT

## 2019-01-11 PROCEDURE — 86850 RBC ANTIBODY SCREEN: CPT

## 2019-01-11 PROCEDURE — 80053 COMPREHEN METABOLIC PANEL: CPT | Mod: 91

## 2019-01-11 PROCEDURE — 85730 THROMBOPLASTIN TIME PARTIAL: CPT | Mod: 91

## 2019-01-11 PROCEDURE — 93454 CORONARY ARTERY ANGIO S&I: CPT | Mod: 26,,, | Performed by: INTERNAL MEDICINE

## 2019-01-11 PROCEDURE — C1769 GUIDE WIRE: HCPCS | Performed by: INTERNAL MEDICINE

## 2019-01-11 PROCEDURE — 93010 ELECTROCARDIOGRAM REPORT: CPT | Mod: 76,,, | Performed by: INTERNAL MEDICINE

## 2019-01-11 PROCEDURE — 83880 ASSAY OF NATRIURETIC PEPTIDE: CPT

## 2019-01-11 PROCEDURE — 25000003 PHARM REV CODE 250: Performed by: STUDENT IN AN ORGANIZED HEALTH CARE EDUCATION/TRAINING PROGRAM

## 2019-01-11 PROCEDURE — 63600175 PHARM REV CODE 636 W HCPCS: Performed by: HOSPITALIST

## 2019-01-11 PROCEDURE — 83735 ASSAY OF MAGNESIUM: CPT

## 2019-01-11 PROCEDURE — 25000003 PHARM REV CODE 250: Performed by: INTERNAL MEDICINE

## 2019-01-11 PROCEDURE — 84484 ASSAY OF TROPONIN QUANT: CPT

## 2019-01-11 PROCEDURE — 93454 PR CATH PLACE/CORONARY ANGIO, IMG SUPER/INTERP: ICD-10-PCS | Mod: 26,,, | Performed by: INTERNAL MEDICINE

## 2019-01-11 PROCEDURE — 99152 MOD SED SAME PHYS/QHP 5/>YRS: CPT | Performed by: INTERNAL MEDICINE

## 2019-01-11 PROCEDURE — 80053 COMPREHEN METABOLIC PANEL: CPT

## 2019-01-11 PROCEDURE — C1894 INTRO/SHEATH, NON-LASER: HCPCS | Performed by: INTERNAL MEDICINE

## 2019-01-11 PROCEDURE — 99232 PR SUBSEQUENT HOSPITAL CARE,LEVL II: ICD-10-PCS | Mod: ,,, | Performed by: NURSE PRACTITIONER

## 2019-01-11 PROCEDURE — 99152 PR MOD CONSCIOUS SEDATION, SAME PHYS, 5+ YRS, FIRST 15 MIN: ICD-10-PCS | Mod: ,,, | Performed by: INTERNAL MEDICINE

## 2019-01-11 PROCEDURE — 93005 ELECTROCARDIOGRAM TRACING: CPT

## 2019-01-11 PROCEDURE — 36415 COLL VENOUS BLD VENIPUNCTURE: CPT

## 2019-01-11 PROCEDURE — 93010 EKG 12-LEAD: ICD-10-PCS | Mod: ,,, | Performed by: INTERNAL MEDICINE

## 2019-01-11 PROCEDURE — 86901 BLOOD TYPING SEROLOGIC RH(D): CPT | Mod: 91

## 2019-01-11 PROCEDURE — 99232 SBSQ HOSP IP/OBS MODERATE 35: CPT | Mod: ,,, | Performed by: NURSE PRACTITIONER

## 2019-01-11 PROCEDURE — 93454 CORONARY ARTERY ANGIO S&I: CPT | Performed by: INTERNAL MEDICINE

## 2019-01-11 PROCEDURE — 20600001 HC STEP DOWN PRIVATE ROOM

## 2019-01-11 PROCEDURE — 25500020 PHARM REV CODE 255: Performed by: INTERNAL MEDICINE

## 2019-01-11 PROCEDURE — 25000003 PHARM REV CODE 250: Performed by: HOSPITALIST

## 2019-01-11 PROCEDURE — 99153 MOD SED SAME PHYS/QHP EA: CPT | Performed by: INTERNAL MEDICINE

## 2019-01-11 RX ORDER — FENTANYL CITRATE 50 UG/ML
INJECTION, SOLUTION INTRAMUSCULAR; INTRAVENOUS
Status: DISCONTINUED | OUTPATIENT
Start: 2019-01-11 | End: 2019-01-11 | Stop reason: HOSPADM

## 2019-01-11 RX ORDER — LIDOCAINE HYDROCHLORIDE 20 MG/ML
INJECTION, SOLUTION EPIDURAL; INFILTRATION; INTRACAUDAL; PERINEURAL
Status: DISCONTINUED | OUTPATIENT
Start: 2019-01-11 | End: 2019-01-11 | Stop reason: HOSPADM

## 2019-01-11 RX ORDER — HEPARIN SODIUM 1000 [USP'U]/ML
INJECTION, SOLUTION INTRAVENOUS; SUBCUTANEOUS
Status: DISCONTINUED | OUTPATIENT
Start: 2019-01-11 | End: 2019-01-11 | Stop reason: HOSPADM

## 2019-01-11 RX ORDER — METOPROLOL SUCCINATE 25 MG/1
25 TABLET, EXTENDED RELEASE ORAL DAILY
Status: ON HOLD | COMMUNITY
End: 2019-01-12 | Stop reason: SDUPTHER

## 2019-01-11 RX ORDER — HEPARIN SODIUM 200 [USP'U]/100ML
INJECTION, SOLUTION INTRAVENOUS
Status: DISCONTINUED | OUTPATIENT
Start: 2019-01-11 | End: 2019-01-12 | Stop reason: HOSPADM

## 2019-01-11 RX ORDER — LISINOPRIL 5 MG/1
2.5 TABLET ORAL DAILY
Status: ON HOLD | COMMUNITY
End: 2019-01-12 | Stop reason: HOSPADM

## 2019-01-11 RX ORDER — VERAPAMIL HYDROCHLORIDE 2.5 MG/ML
INJECTION, SOLUTION INTRAVENOUS
Status: DISCONTINUED | OUTPATIENT
Start: 2019-01-11 | End: 2019-01-11 | Stop reason: HOSPADM

## 2019-01-11 RX ORDER — MIDAZOLAM HYDROCHLORIDE 1 MG/ML
INJECTION, SOLUTION INTRAMUSCULAR; INTRAVENOUS
Status: DISCONTINUED | OUTPATIENT
Start: 2019-01-11 | End: 2019-01-11 | Stop reason: HOSPADM

## 2019-01-11 RX ORDER — NITROGLYCERIN 5 MG/ML
INJECTION, SOLUTION INTRAVENOUS
Status: DISCONTINUED | OUTPATIENT
Start: 2019-01-11 | End: 2019-01-11 | Stop reason: HOSPADM

## 2019-01-11 RX ORDER — DIPHENHYDRAMINE HCL 50 MG
50 CAPSULE ORAL ONCE
Status: COMPLETED | OUTPATIENT
Start: 2019-01-11 | End: 2019-01-11

## 2019-01-11 RX ADMIN — ASPIRIN 81 MG CHEWABLE TABLET 81 MG: 81 TABLET CHEWABLE at 08:01

## 2019-01-11 RX ADMIN — SODIUM CHLORIDE 1 ML/KG/HR: 0.9 INJECTION, SOLUTION INTRAVENOUS at 06:01

## 2019-01-11 RX ADMIN — CARVEDILOL 3.12 MG: 3.12 TABLET, FILM COATED ORAL at 09:01

## 2019-01-11 RX ADMIN — DIPHENHYDRAMINE HYDROCHLORIDE 50 MG: 50 CAPSULE ORAL at 02:01

## 2019-01-11 RX ADMIN — HUMAN ALBUMIN MICROSPHERES AND PERFLUTREN 0.66 MG: 10; .22 INJECTION, SOLUTION INTRAVENOUS at 09:01

## 2019-01-11 RX ADMIN — ATORVASTATIN CALCIUM 80 MG: 20 TABLET, FILM COATED ORAL at 08:01

## 2019-01-11 RX ADMIN — LISINOPRIL 10 MG: 10 TABLET ORAL at 08:01

## 2019-01-11 RX ADMIN — SODIUM CHLORIDE 250 ML: 0.9 INJECTION, SOLUTION INTRAVENOUS at 10:01

## 2019-01-11 RX ADMIN — FUROSEMIDE 20 MG: 20 TABLET ORAL at 08:01

## 2019-01-11 RX ADMIN — SPIRONOLACTONE 25 MG: 25 TABLET ORAL at 08:01

## 2019-01-11 RX ADMIN — CLOPIDOGREL 75 MG: 75 TABLET, FILM COATED ORAL at 08:01

## 2019-01-11 RX ADMIN — HEPARIN SODIUM AND DEXTROSE 15 UNITS/KG/HR: 10000; 5 INJECTION INTRAVENOUS at 09:01

## 2019-01-11 RX ADMIN — DIPHENHYDRAMINE HYDROCHLORIDE 25 MG: 25 CAPSULE ORAL at 06:01

## 2019-01-11 NOTE — CONSULTS
"[facility]  Interventional Cardiology  Consult Note    Patient Name: Osei Castorena  MRN: [id number]  Admission Date: 1/10/2019  Hospital Length of Stay: 1 days  Code Status: Full Code   Attending Provider: Robert Brock MD  Consulting Provider: Lukasz Duron MD  Primary Care Physician: Onesimo Vang NP  Principal Problem:Unstable angina    Patient information was obtained from patient and ER records.     Inpatient consult to Interventional Cardiology  Consult performed by: Lukasz Duron MD  Consult ordered by: Tomasa Whitney MD    Inpatient consult to Interventional Cardiology  Consult performed by: Lukasz Duron MD  Consult ordered by: Jacinta Abdi MD        Subjective:     Chief Complaint:  Chest pain     HPI:  Mr. Castorena is a 53 year old male with PMH ischemic cardiomyopathy s/p PCI x 5 in 12/17 at [facility] in [facility] (anatomy unknown, patient was never given an information card), EF < 10% despite GDMT s/p CRT-D, tobacco abuse who was sent to the ER from clinic for intermittent chest pain. He reports intermittent chest pain that has been occurring over the past month, which is subxiphoid and beneath his left arm, occurs at rest, not associated with exertion, lasting anywhere from an hour to an entire day.     When he was initially revascularized he had no symptoms of angina; he had developed heart failure symptoms a week before he went to the hospital and had significant NYHA III-IV FRENCH, orthopnea and PND. As of late he has had symptoms of FRENCH and although he sleeps on one pillow he does have orthopnea as it is "difficult to sleep that way". He has not had peripheral edema. He continues to smoke, denies illicit substance use. He reports variable compliance with his medications due to unpleasant side effects, specifically, although he took his aspirin daily, he rarely took his plavix. He has had frequent palpitations but no syncope.      Past Medical History: "   Diagnosis Date    CHF (congestive heart failure)     Hyperlipidemia     Myocardial infarction        Past Surgical History:   Procedure Laterality Date    APPENDECTOMY      HEART CATH-RIGHT Right 5/22/2018    Performed by Tunde Hernadez MD at [facility] CATH LAB    INSERTION, ICD, BIVENTRICULAR N/A 9/28/2018    Performed by Daniel Chand MD at [facility] CATH LAB       Review of patient's allergies indicates:  No Known Allergies      (Not in a hospital admission)  Family History     None        Tobacco Use    Smoking status: Current Some Day Smoker     Packs/day: 0.50    Smokeless tobacco: Never Used   Substance and Sexual Activity    Alcohol use: No    Drug use: Not on file    Sexual activity: Not on file     Review of Systems   Constitution: Negative.   HENT: Negative.    Eyes: Negative.    Cardiovascular: Positive for chest pain, dyspnea on exertion, irregular heartbeat, orthopnea and palpitations. Negative for leg swelling, near-syncope, paroxysmal nocturnal dyspnea and syncope.   Respiratory: Positive for cough and shortness of breath.    Endocrine: Negative.    Hematologic/Lymphatic: Negative.    Skin: Negative.    Musculoskeletal: Negative.    Gastrointestinal: Negative.    Genitourinary: Negative.    Neurological: Negative.    Psychiatric/Behavioral: Negative.      Objective:     Vital Signs (Most Recent):  Temp: 98.3 °F (36.8 °C) (01/10/19 1603)  Pulse: 66 (01/10/19 1631)  Resp: 18 (01/10/19 1603)  BP: 111/69 (01/10/19 1631)  SpO2: 99 % (01/10/19 1631) Vital Signs (24h Range):  Temp:  [98.3 °F (36.8 °C)] 98.3 °F (36.8 °C)  Pulse:  [65-67] 66  Resp:  [18] 18  SpO2:  [95 %-99 %] 99 %  BP: ()/(57-72) 111/69     Weight: 58.1 kg (128 lb)  Body mass index is 21.3 kg/m².    SpO2: 99 %       No intake or output data in the 24 hours ending 01/10/19 1703    Lines/Drains/Airways     Peripheral Intravenous Line                 Peripheral IV - Single Lumen 01/10/19 1621 Left Antecubital less than 1 day          Peripheral IV - Single Lumen 01/10/19 1622 Right Hand less than 1 day                Physical Exam   Constitutional: He is oriented to person, place, and time. He appears well-developed and well-nourished. No distress.   HENT:   Head: Normocephalic and atraumatic.   Neck: No JVD present.   Cardiovascular: Normal rate, regular rhythm, normal heart sounds and intact distal pulses. Exam reveals no gallop and no friction rub.   No murmur heard.  Pulses:       Radial pulses are 2+ on the right side, and 2+ on the left side.        Femoral pulses are 2+ on the right side, and 2+ on the left side.       Dorsalis pedis pulses are 2+ on the right side, and 2+ on the left side.        Posterior tibial pulses are 0 on the right side, and 0 on the left side.   Pulmonary/Chest: Effort normal. No respiratory distress. He has no wheezes. He has no rales.   Abdominal: Soft. Bowel sounds are normal. He exhibits no distension. There is no tenderness.   Musculoskeletal: He exhibits no edema.   Neurological: He is alert and oriented to person, place, and time.   Skin: He is not diaphoretic.       Significant Labs:     Recent Results (from the past 24 hour(s))   CBC auto differential    Collection Time: 01/10/19  4:20 PM   Result Value Ref Range    WBC 6.10 3.90 - 12.70 K/uL    RBC 5.22 4.60 - 6.20 M/uL    Hemoglobin 15.1 14.0 - 18.0 g/dL    Hematocrit 46.5 40.0 - 54.0 %    MCV 89 82 - 98 fL    MCH 28.9 27.0 - 31.0 pg    MCHC 32.5 32.0 - 36.0 g/dL    RDW 12.7 11.5 - 14.5 %    Platelets 372 (H) 150 - 350 K/uL    MPV 8.7 (L) 9.2 - 12.9 fL    Immature Granulocytes 0.2 0.0 - 0.5 %    Gran # (ANC) 2.8 1.8 - 7.7 K/uL    Immature Grans (Abs) 0.01 0.00 - 0.04 K/uL    Lymph # 2.1 1.0 - 4.8 K/uL    Mono # 0.7 0.3 - 1.0 K/uL    Eos # 0.4 0.0 - 0.5 K/uL    Baso # 0.10 0.00 - 0.20 K/uL    nRBC 0 0 /100 WBC    Gran% 45.4 38.0 - 73.0 %    Lymph% 34.6 18.0 - 48.0 %    Mono% 11.5 4.0 - 15.0 %    Eosinophil% 6.7 0.0 - 8.0 %    Basophil% 1.6 0.0 - 1.9 %     Differential Method Automated    Comprehensive metabolic panel    Collection Time: 01/10/19  4:20 PM   Result Value Ref Range    Sodium 137 136 - 145 mmol/L    Potassium 4.1 3.5 - 5.1 mmol/L    Chloride 101 95 - 110 mmol/L    CO2 29 23 - 29 mmol/L    Glucose 88 70 - 110 mg/dL    BUN, Bld 7 6 - 20 mg/dL    Creatinine 0.9 0.5 - 1.4 mg/dL    Calcium 9.4 8.7 - 10.5 mg/dL    Total Protein 8.0 6.0 - 8.4 g/dL    Albumin 3.6 3.5 - 5.2 g/dL    Total Bilirubin 0.2 0.1 - 1.0 mg/dL    Alkaline Phosphatase 118 55 - 135 U/L    AST 14 10 - 40 U/L    ALT 6 (L) 10 - 44 U/L    Anion Gap 7 (L) 8 - 16 mmol/L    eGFR if African American >60.0 >60 mL/min/1.73 m^2    eGFR if non African American >60.0 >60 mL/min/1.73 m^2   Protime-INR    Collection Time: 01/10/19  4:20 PM   Result Value Ref Range    Prothrombin Time 10.1 9.0 - 12.5 sec    INR 1.0 0.8 - 1.2   Troponin I    Collection Time: 01/10/19  4:20 PM   Result Value Ref Range    Troponin I 0.016 0.000 - 0.026 ng/mL   Type & Screen    Collection Time: 01/10/19  4:20 PM   Result Value Ref Range    Group & Rh A POS     Indirect Ezekiel POS    Antibody identification    Collection Time: 01/10/19  4:20 PM   Result Value Ref Range    Antibody Identification POS    APTT    Collection Time: 01/10/19  9:40 PM   Result Value Ref Range    aPTT 40.2 (H) 21.0 - 32.0 sec   Lipid panel    Collection Time: 01/10/19  9:40 PM   Result Value Ref Range    Cholesterol 360 (H) 120 - 199 mg/dL    Triglycerides 108 30 - 150 mg/dL    HDL 37 (L) 40 - 75 mg/dL    LDL Cholesterol 301.4 (H) 63.0 - 159.0 mg/dL    HDL/Chol Ratio 10.3 (L) 20.0 - 50.0 %    Total Cholesterol/HDL Ratio 9.7 (H) 2.0 - 5.0    Non-HDL Cholesterol 323 mg/dL   Hemoglobin A1c    Collection Time: 01/10/19  9:40 PM   Result Value Ref Range    Hemoglobin A1C 5.3 4.0 - 5.6 %    Estimated Avg Glucose 105 68 - 131 mg/dL       Significant Imaging:     I have reviewed all pertinent imaging studies from the last 24 hours      Assessment and Plan:      * Unstable angina    EFRAÍN 4  Currently on ACS protocol, continue ASA, clopidogrel, heparin, high intensity statin, carvedilol, lisinopril  Patient reportedly had 5 coronary stents placed just over one year ago with admitted clopidogrel non-compliance and has recurring chest pain  The patient is high risk for having had an event of IST or ISR  Troponin negative, chest pain free, EKG unchanged from initial presentation, difficult to interpret for ischemia given Bi V pacing  Plan for [facility] for further evaluation    1. Cardiac catheterization with possible PCI.   2. Antiplatelets: Aspirin, Clopidogrel  3. Access: Right radial  4. Pt is a AJAY candidate and understands the importance of taking plavix for at least one year, understands that in case of receiving a drug coated stent the failure to comply with dual anti-platelet therapy is likely to result in stent clothing, heart attack and death.   5. The risks, benefits, and alternatives of coronary vascular angiography and possible intervention were discussed with the patient. All questions were answered and informed consent was obtained. I had a detailed discussion with the patient regarding risk of stroke, MI, bleeding access site complications including limb loss, allergy, kidney failure including dialysis and death.  6. The patient understands the risks and benefits and wishes to go ahead with the procedure.  7. All patient's questions were answered         VTE Risk Mitigation (From admission, onward)        Ordered     heparin 25,000 units in dextrose 5% 250 mL (100 units/mL) infusion LOW INTENSITY nomogram - [facility]  Continuous      01/10/19 2059     heparin 25,000 units in dextrose 5% (100 units/ml) IV bolus from bag - ADDITIONAL PRN BOLUS - 60 units/kg (max bolus 4000 units)  As needed (PRN)      01/10/19 2059     heparin 25,000 units in dextrose 5% (100 units/ml) IV bolus from bag - ADDITIONAL PRN BOLUS - 30 units/kg (max bolus 4000 units)  As needed (PRN)       01/10/19 2059     IP VTE HIGH RISK PATIENT  Once      01/10/19 2059     heparin 25,000 units in dextrose 5% 250 mL (100 units/mL) infusion (heparin infusion)  Continuous      01/10/19 8922          Thank you for your consult.     Lukasz Duron MD  Interventional Cardiology   [facility]

## 2019-01-11 NOTE — ASSESSMENT & PLAN NOTE
-s/p PCI/stent to RCA at OSH to RCA in 2017, on plavix, asa, lipitor, coreg at home now, denies missing doses, risk factor include continued nicotine abuse (discussed smoking, he has cut down and working on stopping totally)  -signs of unstable angina on presentation to cards clinic (see unstable angina)  - Due for [facility] today

## 2019-01-11 NOTE — ASSESSMENT & PLAN NOTE
- history of systolic heart failure with EF of 8% and diastolic dysfunction  - Ef 13%, IVC 8, GHK, PAP 29  - not volume overloaded on exam, BNP lowest it's ever been 199, CXR cardiomegaly, no effusions or edema  - continue home dose of lasix  - dc'ed lisinopril in lieu to start entresto low dose tomorrow pm first dose at home, requires prior auth, but can get 30 days free from our pharmacy  - continue GDMT aldactone, coreg

## 2019-01-11 NOTE — NURSING
Patient identified by 2 identifiers. Denies previous reactions to blood transfusions and allergies reviewed.  Procedure explained & consent obtained.  18 g IV in place to Rt Hand, flushed w/ 10cc NS pre & post contrast administration.  3cc Optison administered, echo images obtained.  Pt tolerated procedure well.

## 2019-01-11 NOTE — BRIEF OP NOTE
"Post Cath Note  Referring Physician: Robert Brock MD  Procedure: Left heart cath (Left)       Access: Right radial    Non-obstructive coronary disease. Patent stents.    See full report for further details    Intervention:     Closure device: Radial band    Post Cath Exam:   BP (!) 90/50 (BP Location: Left arm, Patient Position: Lying)   Pulse 61   Temp 98.3 °F (36.8 °C) (Oral)   Resp 18   Ht 5' 4.96" (1.65 m)   Wt 58.1 kg (128 lb)   SpO2 97%   BMI 21.33 kg/m²   No unusual pain, hematoma, thrill or bruit at vascular access site.  Distal pulse present without signs of ischemia.    Recommendations:   - Routine post-cath care  - IVF at 1 cc/kg/hr for 4 hrs  - Continue medical management, Risk factor reduction, Follow-up with outpatient cardiologist    Lukasz Duron MD  PGY-IV      "

## 2019-01-11 NOTE — ASSESSMENT & PLAN NOTE
-patient with episodes of chest pressure at rest and with activity in last few months, worsening now and relieved with nitro, with CAD hx and symptoms, concerning for unstable angina  -ACS protocol/pathway initiated per cards recs. Plavix/asa loaded. Cont daily asa, plavix, statin, BB tomorrow  -on heparin drip. Trop neg in ED, EKG with bifasciscular block with AICD. No chest pain on exam. Trend trop in interim to ensure no rise, wouldn't expect given no active chest pain symptoms  -plan for LHC in AM per cardiology note.  -NPO at [facility]

## 2019-01-11 NOTE — H&P
[facility] Hospital Medicine  History & Physical    Patient Name: Osei Castorena  MRN: [id number]  Admission Date: 1/10/2019  Attending Physician: Robert Brock MD   Primary Care Provider: Onesimo Vang NP    [facility] Medicine Team: [facility] HOSP MED J Tomasa Whitney MD     Patient information was obtained from patient, past medical records and ER records.     Subjective:     Principal Problem:Unstable angina    Chief Complaint:   Chief Complaint   Patient presents with    Chest Pain     Coming from EP lab with CP, ekg changes, same pain as MI 1 year ago.         HPI: Mr. Castorena is a 53 year old male who was in his usual state of health until the last few weeks-months or so when he started having pressure like chest pain in the substernal area. The events happen at any time- he could be watching or walking around when they occur. They sometimes last seconds and sometimes last minutes to an hour. If they dont self resolve then he takes nitro at home and it resolves. He never has to take more than 1 nitroglycerin for this pain. He can't state how often they happen but he thinks the last one was about 1-2 weeks ago. He came in today for an AICD check up and told the doctors about it and they were concerned about unstable angina and sent him to the ED. He has a significant cardiac history with CAD with an MI/AJAY to the RCA in 2017 with a resulting EF of 8%, DD with AICD. He follows with heart transplant here and was worked up for transplant and LVAD and has not been a candidate at this time due to social concerns/lack of social support. He denies missing any doses of medications including plavix. He reports the pain is different from his last MI and describes it as pressure like without radiation. He does have associated SOB during the episodes but denies N/V.   In the ED his initial EKG was concerning for ST elevations but he is paced and therefore not reliable by typical criteria for STEMI,  bedside eval of rhythm by cards with bifascicular block, cards recommends to treat as unstable angina/ACS protocol with plavix load, aspirin load, heparin drip and to cath in AM.   On exam patient has no further complaints at this time except insomnia.    Past Medical History:   Diagnosis Date    CHF (congestive heart failure)     Hyperlipidemia     Myocardial infarction        Past Surgical History:   Procedure Laterality Date    APPENDECTOMY      HEART CATH-RIGHT Right 5/22/2018    Performed by Tunde [facility]adez [facility] at [facility] CATH LAB    INSERTION, ICD, BIVENTRICULAR N/A 9/28/2018    Performed by Daniel Chand MD at [facility] CATH LAB       Review of patient's allergies indicates:  No Known Allergies    No current facility-administered medications on file prior to encounter.      Current Outpatient Medications on File Prior to Encounter   Medication Sig    aspirin 81 MG Chew Take 1 tablet (81 mg total) by mouth once daily.    atorvastatin (LIPITOR) 80 MG tablet Take 1 tablet (80 mg total) by mouth once daily.    carvedilol (COREG) 3.125 MG tablet Take 1 tablet (3.125 mg total) by mouth 2 (two) times daily.    clopidogrel (PLAVIX) 75 mg tablet Take 1 tablet (75 mg total) by mouth once daily.    furosemide (LASIX) 20 MG tablet Take 1 tablet (20 mg total) by mouth once daily.    lisinopril 10 MG tablet Take 1 tablet (10 mg total) by mouth once daily.    spironolactone (ALDACTONE) 25 MG tablet Take 1 tablet (25 mg total) by mouth once daily.     Family History     None        Tobacco Use    Smoking status: Current Some Day Smoker     Packs/day: 0.50    Smokeless tobacco: Never Used   Substance and Sexual Activity    Alcohol use: No    Drug use: Not on file    Sexual activity: Not on file     Review of Systems   Constitutional: Negative for activity change, appetite change, chills, diaphoresis, fatigue, fever and unexpected weight change.   HENT: Negative for congestion, dental problem, mouth sores,  postnasal drip, rhinorrhea, sinus pressure and sinus pain.    Eyes: Negative for pain, discharge and itching.   Respiratory: Positive for chest tightness and shortness of breath. Negative for wheezing.    Cardiovascular: Positive for chest pain. Negative for palpitations and leg swelling.   Gastrointestinal: Negative for abdominal distention, abdominal pain, constipation, diarrhea, nausea and vomiting.   Endocrine: Negative for cold intolerance and heat intolerance.   Genitourinary: Negative for decreased urine volume, dysuria, frequency and urgency.   Musculoskeletal: Negative for arthralgias, back pain, gait problem, myalgias and neck stiffness.   Skin: Negative for color change, pallor and wound.   Neurological: Negative for dizziness, weakness, light-headedness and headaches.   Hematological: Negative for adenopathy. Does not bruise/bleed easily.   Psychiatric/Behavioral: Positive for sleep disturbance. Negative for agitation and behavioral problems. The patient is not nervous/anxious.      Objective:     Vital Signs (Most Recent):  Temp: 97 °F (36.1 °C) (01/10/19 2021)  Pulse: 63 (01/10/19 2021)  Resp: 16 (01/10/19 2021)  BP: 106/61 (01/10/19 2021)  SpO2: 97 % (01/10/19 2021) Vital Signs (24h Range):  Temp:  [97 °F (36.1 °C)-98.3 °F (36.8 °C)] 97 °F (36.1 °C)  Pulse:  [63-67] 63  Resp:  [16-18] 16  SpO2:  [95 %-99 %] 97 %  BP: ()/(56-72) 106/61     Weight: 58.1 kg (128 lb)  Body mass index is 21.3 kg/m².    Physical Exam   Constitutional: He is oriented to person, place, and time. He appears well-developed and well-nourished. No distress.   HENT:   Head: Normocephalic and atraumatic.   Mouth/Throat: No oropharyngeal exudate.   Poor oral dentition   Eyes: Conjunctivae and EOM are normal. Pupils are equal, round, and reactive to light. No scleral icterus.   Neck: Normal range of motion. Neck supple. No JVD present. No tracheal deviation present. No thyromegaly present.   Cardiovascular: Normal rate, regular  rhythm, normal heart sounds and intact distal pulses. Exam reveals no gallop.   Thin body habitus- easily visible AICD through skin on left chest wall   Pulmonary/Chest: Effort normal and breath sounds normal. He has no wheezes. He exhibits no tenderness.   Abdominal: Soft. Bowel sounds are normal. He exhibits no distension. There is no guarding.   Musculoskeletal: Normal range of motion. He exhibits no edema, tenderness or deformity.   Lymphadenopathy:     He has no cervical adenopathy.   Neurological: He is alert and oriented to person, place, and time. He displays normal reflexes. No cranial nerve deficit or sensory deficit. He exhibits normal muscle tone.   Skin: Skin is warm and dry. Capillary refill takes less than 2 seconds. No rash noted. He is not diaphoretic. No erythema. No pallor.   Psychiatric: He has a normal mood and affect. His behavior is normal. Judgment and thought content normal.         CRANIAL NERVES     CN III, IV, VI   Pupils are equal, round, and reactive to light.  Extraocular motions are normal.        Significant Labs:   CBC:   Recent Labs   Lab 01/10/19  1620   WBC 6.10   HGB 15.1   HCT 46.5   *     CMP:   Recent Labs   Lab 01/10/19  1620      K 4.1      CO2 29   GLU 88   BUN 7   CREATININE 0.9   CALCIUM 9.4   PROT 8.0   ALBUMIN 3.6   BILITOT 0.2   ALKPHOS 118   AST 14   ALT 6*   ANIONGAP 7*   EGFRNONAA >60.0     Cardiac Markers: No results for input(s): CKMB, MYOGLOBIN, BNP, TROPISTAT in the last 48 hours.    Significant Imaging: I have reviewed all pertinent imaging results/findings within the past 24 hours.    Assessment/Plan:     * Unstable angina    -patient with episodes of chest pressure at rest and with activity in last few months, worsening now and relieved with nitro, with CAD hx and symptoms, concerning for unstable angina  -ACS protocol/pathway initiated per cards recs. Plavix/asa loaded. Cont daily asa, plavix, statin, BB tomorrow  -on heparin drip. Trop  neg in ED, EKG with bifasciscular block with AICD. No chest pain on exam. Trend trop in interim to ensure no rise, wouldn't expect given no active chest pain symptoms  -plan for LHC in AM per cardiology note.  -NPO at [facility]       Chest pain           Smoker    -cutting down, working on stopping completely. Discussed quitting and counselled. As on active ACS protocol now, would avoid nicotine patch as contraindicated in active acs       Coronary artery disease involving native coronary artery of native heart without angina pectoris    -s/p PCI/stent to RCA at OSH to RCA in 2017, on plavix, asa, lipitor, coreg at home now, denies missing doses, risk factor include continued nicotine abuse (discussed smoking, he has cut down and working on stopping totally)  -signs of unstable angina on presentation to cards clinic (see unstable angina)  -plan for LHC tomorrow to assess        Chronic combined systolic and diastolic heart failure    -history of systolic heart failure with EF of 8% and diastolic dysfunction  -not volume overloaded on exam now, no CXR ordered in ED, will order for completeness now  -continue home dose of lasix now, lisinopril, aldactone, coreg       S/P angioplasty with stent    -see CAD         VTE Risk Mitigation (From admission, onward)        Ordered     heparin 25,000 units in dextrose 5% 250 mL (100 units/mL) infusion LOW INTENSITY nomogram - [facility]  Continuous      01/10/19 2059     heparin 25,000 units in dextrose 5% (100 units/ml) IV bolus from bag - ADDITIONAL PRN BOLUS - 60 units/kg (max bolus 4000 units)  As needed (PRN)      01/10/19 2059     heparin 25,000 units in dextrose 5% (100 units/ml) IV bolus from bag - ADDITIONAL PRN BOLUS - 30 units/kg (max bolus 4000 units)  As needed (PRN)      01/10/19 2059     IP VTE HIGH RISK PATIENT  Once      01/10/19 2059     heparin 25,000 units in dextrose 5% 250 mL (100 units/mL) infusion (heparin infusion)  Continuous      01/10/19 1634         Dispo: ACS  pathway for unstable angina. NPO at [facility] for cath tomorrow. If negative likely home tomorrow. If stents likely watch and then home over weekend.    Tomasa Whitney MD  Department of Hospital Medicine   [facility]

## 2019-01-11 NOTE — PROGRESS NOTES
[facility] Hospital Medicine  Progress Note    Patient Name: Osei Castorena  MRN: [id number]  Patient Class: IP- Inpatient   Admission Date: 1/10/2019  Length of Stay: 1 days  Attending Physician: Robert Brock MD  Primary Care Provider: Onesimo Vang NP    [facility] Medicine Team: [facility] MED J Citlaly Vallejo NP    Subjective:     Principal Problem:Unstable angina    HPI:  Mr. Castorena is a 53 year old male who was in his usual state of health until the last few weeks-months or so when he started having pressure like chest pain in the substernal area. The events happen at any time- he could be watching or walking around when they occur. They sometimes last seconds and sometimes last minutes to an hour. If they dont self resolve then he takes nitro at home and it resolves. He never has to take more than 1 nitroglycerin for this pain. He can't state how often they happen but he thinks the last one was about 1-2 weeks ago. He came in today for an AICD check up and told the doctors about it and they were concerned about unstable angina and sent him to the ED. He has a significant cardiac history with CAD with an MI/AJAY to the RCA in 2017 with a resulting EF of 8%, DD with AICD. He follows with heart transplant here and was worked up for transplant and LVAD and has not been a candidate at this time due to social concerns/lack of social support. He denies missing any doses of medications including plavix. He reports the pain is different from his last MI and describes it as pressure like without radiation. He does have associated SOB during the episodes but denies N/V.   In the ED his initial EKG was concerning for ST elevations but he is paced and therefore not reliable by typical criteria for STEMI, bedside eval of rhythm by cards with bifascicular block, cards recommends to treat as unstable angina/ACS protocol with plavix load, aspirin load, heparin drip and to cath in AM.   On exam patient  has no further complaints at this time except insomnia.    Hospital Course:  Admitted to hospital medicine for [facility]. He's scheduled for [facility] this afternoon.  The notes mention his noncompliance with his plavix d/t side effects, however the patient says different and that he takes his meds that he's ordered.  His cholesterol panel begs to differ vs needing Repatha: chol 360, HDL 37, , , discussed with comanaged who states he can be started on Repatha as an outpatient once he proves he's been taking his lipitor as prescribed. His lisinopril has been discontinued to start Entresto low dose as he has EF 13% and c/o severe fatigue/sob all the time.  BNP slightly elevated at 199 and is actually the lowest he's ever been.  He's on GDMT for heart failue    Dispo: needs to f/u with cardiology closer to home to f/u need for Repatha.     Interval History: Due for [facility] today. Reviewed smoking status and cholesterol panel and educated on both.  Left pamphlet.  He states he takes his statin, discussed repatha and he hates needles, he might be able to get his family member to do it for him.    No acute events o/n.     Review of Systems   Constitutional: Positive for activity change, appetite change (loss of appetite) and fatigue. Negative for chills and fever.   HENT: Negative for congestion, sore throat and trouble swallowing.    Eyes: Negative for redness and visual disturbance.   Respiratory: Positive for cough and shortness of breath. Negative for wheezing.    Cardiovascular: Negative for chest pain, palpitations and leg swelling.        + orthopnea, PND     Gastrointestinal: Negative for abdominal pain, constipation, diarrhea, nausea and vomiting.   Endocrine: Negative for cold intolerance and heat intolerance.   Genitourinary: Negative for decreased urine volume, difficulty urinating and hematuria.   Musculoskeletal: Negative for back pain and myalgias.   Skin: Negative for color change, rash and wound.    Allergic/Immunologic: Negative for immunocompromised state.   Neurological: Negative for dizziness, weakness, light-headedness, numbness and headaches.   Hematological: Does not bruise/bleed easily.   Psychiatric/Behavioral: Negative for agitation, decreased concentration and sleep disturbance. The patient is not nervous/anxious.      Objective:     Vital Signs (Most Recent):  Temp: 98.3 °F (36.8 °C) (01/11/19 1137)  Pulse: 61 (01/11/19 1137)  Resp: 18 (01/11/19 1137)  BP: (!) 90/50 (01/11/19 1137)  SpO2: 97 % (01/11/19 1137) Vital Signs (24h Range):  Temp:  [97 °F (36.1 °C)-98.8 °F (37.1 °C)] 98.3 °F (36.8 °C)  Pulse:  [61-85] 61  Resp:  [16-18] 18  SpO2:  [95 %-99 %] 97 %  BP: ()/(50-78) 90/50     Weight: 58.1 kg (128 lb)  Body mass index is 21.33 kg/m².    Intake/Output Summary (Last 24 hours) at 1/11/2019 1544  Last data filed at 1/11/2019 1300  Gross per 24 hour   Intake 240 ml   Output 500 ml   Net -260 ml      Physical Exam   Constitutional: He is oriented to person, place, and time. He appears well-developed and well-nourished. No distress.   HENT:   Head: Normocephalic and atraumatic.   Right Ear: External ear normal.   Left Ear: External ear normal.   Nose: Nose normal.   Eyes: Conjunctivae and EOM are normal.   Neck: Normal range of motion. Neck supple. No JVD present.   Cardiovascular: Normal rate, regular rhythm and intact distal pulses.   Murmur heard.   Systolic (Mitral position) murmur is present with a grade of 3/6.  + heave    Pulmonary/Chest: Effort normal and breath sounds normal. No respiratory distress. He has no wheezes. He has no rales.   Abdominal: Soft. Bowel sounds are normal. He exhibits no distension and no mass. There is no tenderness. There is no guarding.   Musculoskeletal: Normal range of motion. He exhibits no edema.   Neurological: He is alert and oriented to person, place, and time. No cranial nerve deficit or sensory deficit. Coordination normal.   Skin: Skin is warm and  dry. No rash noted. He is not diaphoretic. No erythema.   Psychiatric: He has a normal mood and affect. His behavior is normal. Judgment and thought content normal.   Nursing note and vitals reviewed.      Significant Labs:   A1C:   Recent Labs   Lab 01/10/19  2140   HGBA1C 5.3     BMP:   Recent Labs   Lab 01/11/19  0536   GLU 75  76     138   K 4.0  4.0     103   CO2 25  27   BUN 8  8   CREATININE 0.8  0.8   CALCIUM 9.6  9.8   MG 2.1     CBC:   Recent Labs   Lab 01/10/19  1620 01/11/19  0536   WBC 6.10 6.69   HGB 15.1 14.7   HCT 46.5 45.0   * 342     CMP:   Recent Labs   Lab 01/10/19  1620 01/11/19  0536    136  138   K 4.1 4.0  4.0    103  103   CO2 29 25  27   GLU 88 75  76   BUN 7 8  8   CREATININE 0.9 0.8  0.8   CALCIUM 9.4 9.6  9.8   PROT 8.0 6.9  7.0   ALBUMIN 3.6 3.2*  3.2*   BILITOT 0.2 0.3  0.3   ALKPHOS 118 107  107   AST 14 13  14   ALT 6* 6*  6*   ANIONGAP 7* 8  8   EGFRNONAA >60.0 >60.0  >60.0     Cardiac Markers:   Recent Labs   Lab 01/11/19  1354   *     Lipid Panel:   Recent Labs   Lab 01/10/19  2140   CHOL 360*   HDL 37*   LDLCALC 301.4*   TRIG 108   CHOLHDL 10.3*     Troponin:   Recent Labs   Lab 01/10/19  1620 01/11/19  0536   TROPONINI 0.016 0.020         Significant Imaging: Echo: I have reviewed all pertinent results/findings within the past 24 hours and my personal findings are:  EF 13%, LVH, GHK, PAP 29, IVC 8  I have reviewed all pertinent imaging results/findings within the past 24 hours.    Assessment/Plan:      * Unstable angina    - patient with episodes of chest pressure at rest and with activity in last few months, worsening now and relieved with nitro, with CAD hx and symptoms, concerning for unstable angina  - ACS protocol/pathway initiated per cards recs. Plavix/asa loaded. Cont daily asa, plavix, statin, BB   - Heparin drip. Trop neg x2, EKG with bifasciscular block with AICD. No chest pain on exam.   - s/p LHC no  intervention.  - last fill of plavix per pharmacy review was last July.   - cholesterol panel quite elevated, appears to need Repatha, but this can be started as an outpatient  - encouraged smoking cessation and gave pamphlet       Chronic combined systolic and diastolic heart failure    - history of systolic heart failure with EF of 8% and diastolic dysfunction  - Ef 13%, IVC 8, GHK, PAP 29  - not volume overloaded on exam, BNP lowest it's ever been 199, CXR cardiomegaly, no effusions or edema  - continue home dose of lasix  - dc'ed lisinopril in lieu to start entresto low dose tomorrow pm first dose at home, requires prior auth, but can get 30 days free from our pharmacy  - continue GDMT aldactone, coreg       Smoker    - cutting down, working on stopping completely.    - Discussed quitting and counseled. Given pamphlet on smoking cessation.    - As on active ACS protocol now, would avoid nicotine patch as contraindicated in active acs       Coronary artery disease involving native coronary artery of native heart without angina pectoris    -s/p PCI/stent to RCA at OSH to RCA in 2017, on plavix, asa, lipitor, coreg at home now, denies missing doses, risk factor include continued nicotine abuse (discussed smoking, he has cut down and working on stopping totally)  -signs of unstable angina on presentation to cards clinic (see unstable angina)  - Due for [facility] today        Chest pain    - on ACS protocol       Biventricular ICD (implantable cardioverter-defibrillator) in place    - In situ       S/P angioplasty with stent    - see CAD         VTE Risk Mitigation (From admission, onward)        Ordered     heparin (porcine) injection  As needed (PRN)      01/11/19 1517     heparin infusion 1,000 units/500 ml in 0.9% NaCl (pressure line flush)  Intra-op continuous PRN      01/11/19 1515     IP VTE HIGH RISK PATIENT  Once      01/10/19 2059              Citlaly Vallejo NP  Department of Hospital Medicine   [facility]  Spectra:  [phone]  Pager: [phone]

## 2019-01-11 NOTE — NURSING
Pt. New admit to the floor , on call notified of pt.'s arrival to floor, pt. Oriented to his room assignment, staff and his current plan of care, vital signs are stable , nsr on telemetry

## 2019-01-11 NOTE — CONSULTS
[facility]  Adult Nutrition  Education Note    Diet Education: Cardiac, NSTEMI pathway, low sodium  Time Spent: 15 min  Learners: Pt      Nutrition Education provided with handouts: Cardiac-TLC Nutrition Therapy, Low Sodium Nutrition Therapy      Comments: Consult received for cardiac diet education. Provided and explained handout detailing heart healthy fats and increasing omega 3 fatty acids and fiber intake. Provided and explained handout detailing common high sodium foods and strategies for reducing sodium intake including label reading, choosing reduced sodium foods, and cooking tips Pt voiced understanding.      Follow-Up: 1x weekly    Please re-consult as needed.

## 2019-01-11 NOTE — CONSULTS
Pt does not qualify for cardiac rehab at this time and pt's insurance does not cover cardiac rehab services

## 2019-01-11 NOTE — ASSESSMENT & PLAN NOTE
EFRAÍN 4  Currently on ACS protocol, continue ASA, clopidogrel, heparin, high intensity statin, carvedilol, lisinopril  Patient reportedly had 5 coronary stents placed just over one year ago with admitted clopidogrel non-compliance and has recurring chest pain  The patient is high risk for having had an event of IST or ISR  Troponin negative, chest pain free, EKG unchanged from initial presentation, difficult to interpret for ischemia given Bi V pacing  Plan for [facility] for further evaluation    1. Cardiac catheterization with possible PCI.   2. Antiplatelets: Aspirin, Clopidogrel  3. Access: Right radial  4. Pt is a AJAY candidate and understands the importance of taking plavix for at least one year, understands that in case of receiving a drug coated stent the failure to comply with dual anti-platelet therapy is likely to result in stent clothing, heart attack and death.   5. The risks, benefits, and alternatives of coronary vascular angiography and possible intervention were discussed with the patient. All questions were answered and informed consent was obtained. I had a detailed discussion with the patient regarding risk of stroke, MI, bleeding access site complications including limb loss, allergy, kidney failure including dialysis and death.  6. The patient understands the risks and benefits and wishes to go ahead with the procedure.  7. All patient's questions were answered

## 2019-01-11 NOTE — HOSPITAL COURSE
Admitted to hospital medicine for [facility]. He is s/p [facility] with non-obstructive coronary disease and patent stents.  The nursing and overnight staff were concerned with his b/p in the 80's despite being asymptomatic with and EF ~ 10%, and an elevated BNP and IVC, he was given post op fluids in addition to an additional 250ml bolus.  If patient needs he can take an extra lasix when he gets home since he has a 6 hour transport ahead.  During med rec review with pharmacy they came across that he had not picked up any plavix since July of last year and has been poorly compliant with some of his other medications.      His cholesterol panel is extremely elevated and most likely 80 of lipitor will not bring down his LDL of 301 enough to be within acceptable levels.  It is our recommendation that he see his cardiologist or PCP to be started on Repatha: chol 360, HDL 37, , . His lisinopril has been discontinued to start Entresto (will get 30 days free while waiting for prior auth to go through) low dose as he has EF 13% and c/o severe fatigue/sob all the time.  He's ordered GDMT for heart failure however it appears he's not been taking his meds as prescribed as noted above.  Will attempt to renew Rx's and have delivered from [facility] upon discharge so he has enough to get him through to next appt with Cardiology/ pcp.     Dispo: needs to f/u with cardiology and or PCP closer to home to f/u initiation of Repatha. Waiting for prior auth to complete for Entresto in interim 30 day free trial

## 2019-01-11 NOTE — PLAN OF CARE
Pt will need [employer] transportation upon d/c. He usually uses [facility] [employer].    Onesimo Vang NP    [facility]  [address] [address]  Phone: [phone] Fax: [phone]    Future Appointments   Date Time Provider Department Center   1/29/2019  1:00 PM LAB, APPOINTMENT [facility] LAB [facility]   1/29/2019  1:40 PM SIX, MINUTE WALK [facility] PUL WLK [facility]   1/29/2019  2:30 PM Khoa Rosa MD [facility] HEARTTX [facility]   4/16/2019  8:00 AM HOME MONITOR DEVICE CHECK, [facility] ARRHPRO [facility]        01/11/19 1123   Discharge Assessment   Assessment Type Discharge Planning Assessment   Confirmed/corrected address and phone number on facesheet? Yes   Assessment information obtained from? Patient   Expected Length of Stay (days) 2   Communicated expected length of stay with patient/caregiver yes   Prior to hospitilization cognitive status: Alert/Oriented   Prior to hospitalization functional status: Independent   Current cognitive status: Alert/Oriented   Current Functional Status: Independent   Lives With alone   Able to Return to Prior Arrangements yes   Is patient able to care for self after discharge? Yes   Who are your caregiver(s) and their phone number(s)? Jose Silvestre  friend    Patient's perception of discharge disposition home or selfcare   Patient currently being followed by outpatient case management? No   Patient currently receives any other outside agency services? No   Equipment Currently Used at Home none   Do you have any problems affording any of your prescribed medications? No   Is the patient taking medications as prescribed? yes   Does the patient have transportation home? Yes   Transportation Anticipated health plan transportation  ([employer] transportation )   Does the patient receive services at the [facility]madin Clinic? No   Discharge Plan A Home   Discharge Plan B Home with family   Patient/Family in Agreement with Plan yes

## 2019-01-11 NOTE — ASSESSMENT & PLAN NOTE
-history of systolic heart failure with EF of 8% and diastolic dysfunction  -not volume overloaded on exam now, no CXR ordered in ED, will order for completeness now  -continue home dose of lasix now, lisinopril, aldactone, coreg

## 2019-01-11 NOTE — PLAN OF CARE
Problem: Adult Inpatient Plan of Care  Goal: Plan of Care Review  Outcome: Ongoing (interventions implemented as appropriate)  Reviewed with pt. His current plan of care and am procedure (cath lab), pt. Verbalized understand of information given

## 2019-01-11 NOTE — ASSESSMENT & PLAN NOTE
-s/p PCI/stent to RCA at OSH to RCA in 2017, on plavix, asa, lipitor, coreg at home now, denies missing doses, risk factor include continued nicotine abuse (discussed smoking, he has cut down and working on stopping totally)  -signs of unstable angina on presentation to cards clinic (see unstable angina)  -plan for [facility] tomorrow to assess

## 2019-01-11 NOTE — ASSESSMENT & PLAN NOTE
-cutting down, working on stopping completely. Discussed quitting and counselled. As on active ACS protocol now, would avoid nicotine patch as contraindicated in active acs

## 2019-01-11 NOTE — SUBJECTIVE & OBJECTIVE
Interval History: Due for [facility] today. Reviewed smoking status and cholesterol panel and educated on both.  Left pamphlet.  He states he takes his statin, discussed repatha and he hates needles, he might be able to get his family member to do it for him.    No acute events o/n.     Review of Systems   Constitutional: Positive for activity change, appetite change (loss of appetite) and fatigue. Negative for chills and fever.   HENT: Negative for congestion, sore throat and trouble swallowing.    Eyes: Negative for redness and visual disturbance.   Respiratory: Positive for cough and shortness of breath. Negative for wheezing.    Cardiovascular: Negative for chest pain, palpitations and leg swelling.        + orthopnea, PND     Gastrointestinal: Negative for abdominal pain, constipation, diarrhea, nausea and vomiting.   Endocrine: Negative for cold intolerance and heat intolerance.   Genitourinary: Negative for decreased urine volume, difficulty urinating and hematuria.   Musculoskeletal: Negative for back pain and myalgias.   Skin: Negative for color change, rash and wound.   Allergic/Immunologic: Negative for immunocompromised state.   Neurological: Negative for dizziness, weakness, light-headedness, numbness and headaches.   Hematological: Does not bruise/bleed easily.   Psychiatric/Behavioral: Negative for agitation, decreased concentration and sleep disturbance. The patient is not nervous/anxious.      Objective:     Vital Signs (Most Recent):  Temp: 98.3 °F (36.8 °C) (01/11/19 1137)  Pulse: 61 (01/11/19 1137)  Resp: 18 (01/11/19 1137)  BP: (!) 90/50 (01/11/19 1137)  SpO2: 97 % (01/11/19 1137) Vital Signs (24h Range):  Temp:  [97 °F (36.1 °C)-98.8 °F (37.1 °C)] 98.3 °F (36.8 °C)  Pulse:  [61-85] 61  Resp:  [16-18] 18  SpO2:  [95 %-99 %] 97 %  BP: ()/(50-78) 90/50     Weight: 58.1 kg (128 lb)  Body mass index is 21.33 kg/m².    Intake/Output Summary (Last 24 hours) at 1/11/2019 1244  Last data filed at  1/11/2019 1300  Gross per 24 hour   Intake 240 ml   Output 500 ml   Net -260 ml      Physical Exam   Constitutional: He is oriented to person, place, and time. He appears well-developed and well-nourished. No distress.   HENT:   Head: Normocephalic and atraumatic.   Right Ear: External ear normal.   Left Ear: External ear normal.   Nose: Nose normal.   Eyes: Conjunctivae and EOM are normal.   Neck: Normal range of motion. Neck supple. No JVD present.   Cardiovascular: Normal rate, regular rhythm and intact distal pulses.   Murmur heard.   Systolic (Mitral position) murmur is present with a grade of 3/6.  + heave    Pulmonary/Chest: Effort normal and breath sounds normal. No respiratory distress. He has no wheezes. He has no rales.   Abdominal: Soft. Bowel sounds are normal. He exhibits no distension and no mass. There is no tenderness. There is no guarding.   Musculoskeletal: Normal range of motion. He exhibits no edema.   Neurological: He is alert and oriented to person, place, and time. No cranial nerve deficit or sensory deficit. Coordination normal.   Skin: Skin is warm and dry. No rash noted. He is not diaphoretic. No erythema.   Psychiatric: He has a normal mood and affect. His behavior is normal. Judgment and thought content normal.   Nursing note and vitals reviewed.      Significant Labs:   A1C:   Recent Labs   Lab 01/10/19  2140   HGBA1C 5.3     BMP:   Recent Labs   Lab 01/11/19  0536   GLU 75  76     138   K 4.0  4.0     103   CO2 25  27   BUN 8  8   CREATININE 0.8  0.8   CALCIUM 9.6  9.8   MG 2.1     CBC:   Recent Labs   Lab 01/10/19  1620 01/11/19  0536   WBC 6.10 6.69   HGB 15.1 14.7   HCT 46.5 45.0   * 342     CMP:   Recent Labs   Lab 01/10/19  1620 01/11/19  0536    136  138   K 4.1 4.0  4.0    103  103   CO2 29 25  27   GLU 88 75  76   BUN 7 8  8   CREATININE 0.9 0.8  0.8   CALCIUM 9.4 9.6  9.8   PROT 8.0 6.9  7.0   ALBUMIN 3.6 3.2*  3.2*   BILITOT  0.2 0.3  0.3   ALKPHOS 118 107  107   AST 14 13  14   ALT 6* 6*  6*   ANIONGAP 7* 8  8   EGFRNONAA >60.0 >60.0  >60.0     Cardiac Markers:   Recent Labs   Lab 01/11/19  1354   *     Lipid Panel:   Recent Labs   Lab 01/10/19  2140   CHOL 360*   HDL 37*   LDLCALC 301.4*   TRIG 108   CHOLHDL 10.3*     Troponin:   Recent Labs   Lab 01/10/19  1620 01/11/19  0536   TROPONINI 0.016 0.020         Significant Imaging: Echo: I have reviewed all pertinent results/findings within the past 24 hours and my personal findings are:  EF 13%, LVH, GHK, PAP 29, IVC 8  I have reviewed all pertinent imaging results/findings within the past 24 hours.

## 2019-01-11 NOTE — HPI
Mr. Castorena is a 53 year old male who was in his usual state of health until the last few weeks-months or so when he started having pressure like chest pain in the substernal area. The events happen at any time- he could be watching or walking around when they occur. They sometimes last seconds and sometimes last minutes to an hour. If they dont self resolve then he takes nitro at home and it resolves. He never has to take more than 1 nitroglycerin for this pain. He can't state how often they happen but he thinks the last one was about 1-2 weeks ago. He came in today for an AICD check up and told the doctors about it and they were concerned about unstable angina and sent him to the ED. He has a significant cardiac history with CAD with an MI/AJAY to the RCA in 2017 with a resulting EF of 8%, DD with AICD. He follows with heart transplant here and was worked up for transplant and LVAD and has not been a candidate at this time due to social concerns/lack of social support. He denies missing any doses of medications including plavix. He reports the pain is different from his last MI and describes it as pressure like without radiation. He does have associated SOB during the episodes but denies N/V.   In the ED his initial EKG was concerning for ST elevations but he is paced and therefore not reliable by typical criteria for STEMI, bedside eval of rhythm by cards with bifascicular block, cards recommends to treat as unstable angina/ACS protocol with plavix load, aspirin load, heparin drip and to cath in AM.   On exam patient has no further complaints at this time except insomnia.

## 2019-01-11 NOTE — SUBJECTIVE & OBJECTIVE
Past Medical History:   Diagnosis Date    CHF (congestive heart failure)     Hyperlipidemia     Myocardial infarction        Past Surgical History:   Procedure Laterality Date    APPENDECTOMY      HEART CATH-RIGHT Right 5/22/2018    Performed by Tunde Hernadez MD at [facility] CATH [facility]    INSERTION, ICD, BIVENTRICULAR N/A 9/28/2018    Performed by Daniel Chand MD at [facility] CATH [facility]       Review of patient's allergies indicates:  No Known Allergies    No current facility-administered medications on file prior to encounter.      Current Outpatient Medications on File Prior to Encounter   Medication Sig    aspirin 81 MG Chew Take 1 tablet (81 mg total) by mouth once daily.    atorvastatin (LIPITOR) 80 MG tablet Take 1 tablet (80 mg total) by mouth once daily.    carvedilol (COREG) 3.125 MG tablet Take 1 tablet (3.125 mg total) by mouth 2 (two) times daily.    clopidogrel (PLAVIX) 75 mg tablet Take 1 tablet (75 mg total) by mouth once daily.    furosemide (LASIX) 20 MG tablet Take 1 tablet (20 mg total) by mouth once daily.    lisinopril 10 MG tablet Take 1 tablet (10 mg total) by mouth once daily.    spironolactone (ALDACTONE) 25 MG tablet Take 1 tablet (25 mg total) by mouth once daily.     Family History     None        Tobacco Use    Smoking status: Current Some Day Smoker     Packs/day: 0.50    Smokeless tobacco: Never Used   Substance and Sexual Activity    Alcohol use: No    Drug use: Not on file    Sexual activity: Not on file     Review of Systems   Constitutional: Negative for activity change, appetite change, chills, diaphoresis, fatigue, fever and unexpected weight change.   HENT: Negative for congestion, dental problem, mouth sores, postnasal drip, rhinorrhea, sinus pressure and sinus pain.    Eyes: Negative for pain, discharge and itching.   Respiratory: Positive for chest tightness and shortness of breath. Negative for wheezing.    Cardiovascular: Positive for chest pain. Negative for  palpitations and leg swelling.   Gastrointestinal: Negative for abdominal distention, abdominal pain, constipation, diarrhea, nausea and vomiting.   Endocrine: Negative for cold intolerance and heat intolerance.   Genitourinary: Negative for decreased urine volume, dysuria, frequency and urgency.   Musculoskeletal: Negative for arthralgias, back pain, gait problem, myalgias and neck stiffness.   Skin: Negative for color change, pallor and wound.   Neurological: Negative for dizziness, weakness, light-headedness and headaches.   Hematological: Negative for adenopathy. Does not bruise/bleed easily.   Psychiatric/Behavioral: Positive for sleep disturbance. Negative for agitation and behavioral problems. The patient is not nervous/anxious.      Objective:     Vital Signs (Most Recent):  Temp: 97 °F (36.1 °C) (01/10/19 2021)  Pulse: 63 (01/10/19 2021)  Resp: 16 (01/10/19 2021)  BP: 106/61 (01/10/19 2021)  SpO2: 97 % (01/10/19 2021) Vital Signs (24h Range):  Temp:  [97 °F (36.1 °C)-98.3 °F (36.8 °C)] 97 °F (36.1 °C)  Pulse:  [63-67] 63  Resp:  [16-18] 16  SpO2:  [95 %-99 %] 97 %  BP: ()/(56-72) 106/61     Weight: 58.1 kg (128 lb)  Body mass index is 21.3 kg/m².    Physical Exam   Constitutional: He is oriented to person, place, and time. He appears well-developed and well-nourished. No distress.   HENT:   Head: Normocephalic and atraumatic.   Mouth/Throat: No oropharyngeal exudate.   Poor oral dentition   Eyes: Conjunctivae and EOM are normal. Pupils are equal, round, and reactive to light. No scleral icterus.   Neck: Normal range of motion. Neck supple. No JVD present. No tracheal deviation present. No thyromegaly present.   Cardiovascular: Normal rate, regular rhythm, normal heart sounds and intact distal pulses. Exam reveals no gallop.   Thin body habitus- easily visible AICD through skin on left chest wall   Pulmonary/Chest: Effort normal and breath sounds normal. He has no wheezes. He exhibits no tenderness.    Abdominal: Soft. Bowel sounds are normal. He exhibits no distension. There is no guarding.   Musculoskeletal: Normal range of motion. He exhibits no edema, tenderness or deformity.   Lymphadenopathy:     He has no cervical adenopathy.   Neurological: He is alert and oriented to person, place, and time. He displays normal reflexes. No cranial nerve deficit or sensory deficit. He exhibits normal muscle tone.   Skin: Skin is warm and dry. Capillary refill takes less than 2 seconds. No rash noted. He is not diaphoretic. No erythema. No pallor.   Psychiatric: He has a normal mood and affect. His behavior is normal. Judgment and thought content normal.         CRANIAL NERVES     CN III, IV, VI   Pupils are equal, round, and reactive to light.  Extraocular motions are normal.        Significant Labs:   CBC:   Recent Labs   Lab 01/10/19  1620   WBC 6.10   HGB 15.1   HCT 46.5   *     CMP:   Recent Labs   Lab 01/10/19  1620      K 4.1      CO2 29   GLU 88   BUN 7   CREATININE 0.9   CALCIUM 9.4   PROT 8.0   ALBUMIN 3.6   BILITOT 0.2   ALKPHOS 118   AST 14   ALT 6*   ANIONGAP 7*   EGFRNONAA >60.0     Cardiac Markers: No results for input(s): CKMB, MYOGLOBIN, BNP, TROPISTAT in the last 48 hours.    Significant Imaging: I have reviewed all pertinent imaging results/findings within the past 24 hours.

## 2019-01-11 NOTE — ASSESSMENT & PLAN NOTE
- cutting down, working on stopping completely.    - Discussed quitting and counseled. Given pamphlet on smoking cessation.    - As on active ACS protocol now, would avoid nicotine patch as contraindicated in active acs

## 2019-01-11 NOTE — ASSESSMENT & PLAN NOTE
- patient with episodes of chest pressure at rest and with activity in last few months, worsening now and relieved with nitro, with CAD hx and symptoms, concerning for unstable angina  - ACS protocol/pathway initiated per cards recs. Plavix/asa loaded. Cont daily asa, plavix, statin, BB   - Heparin drip. Trop neg x2, EKG with bifasciscular block with AICD. No chest pain on exam.   - s/p LHC no intervention.  - last fill of plavix per pharmacy review was last July.   - cholesterol panel quite elevated, appears to need Repatha, but this can be started as an outpatient  - encouraged smoking cessation and gave pamphlet

## 2019-01-12 VITALS
DIASTOLIC BLOOD PRESSURE: 51 MMHG | TEMPERATURE: 98 F | RESPIRATION RATE: 18 BRPM | WEIGHT: 124.13 LBS | BODY MASS INDEX: 21.19 KG/M2 | OXYGEN SATURATION: 98 % | SYSTOLIC BLOOD PRESSURE: 94 MMHG | HEART RATE: 58 BPM | HEIGHT: 64 IN

## 2019-01-12 PROBLEM — I20.0 UNSTABLE ANGINA: Status: RESOLVED | Noted: 2019-01-10 | Resolved: 2019-01-12

## 2019-01-12 LAB
ALBUMIN SERPL BCP-MCNC: 2.9 G/DL
ALP SERPL-CCNC: 97 U/L
ALT SERPL W/O P-5'-P-CCNC: 7 U/L
ANION GAP SERPL CALC-SCNC: 9 MMOL/L
AST SERPL-CCNC: 13 U/L
BASOPHILS # BLD AUTO: 0.06 K/UL
BASOPHILS NFR BLD: 1.1 %
BILIRUB SERPL-MCNC: 0.2 MG/DL
BUN SERPL-MCNC: 14 MG/DL
CALCIUM SERPL-MCNC: 8.9 MG/DL
CHLORIDE SERPL-SCNC: 103 MMOL/L
CO2 SERPL-SCNC: 23 MMOL/L
CREAT SERPL-MCNC: 0.8 MG/DL
DIFFERENTIAL METHOD: ABNORMAL
EOSINOPHIL # BLD AUTO: 0.3 K/UL
EOSINOPHIL NFR BLD: 5.4 %
ERYTHROCYTE [DISTWIDTH] IN BLOOD BY AUTOMATED COUNT: 12.6 %
EST. GFR  (AFRICAN AMERICAN): >60 ML/MIN/1.73 M^2
EST. GFR  (NON AFRICAN AMERICAN): >60 ML/MIN/1.73 M^2
GLUCOSE SERPL-MCNC: 79 MG/DL
HCT VFR BLD AUTO: 41.4 %
HGB BLD-MCNC: 13.3 G/DL
IMM GRANULOCYTES # BLD AUTO: 0.02 K/UL
IMM GRANULOCYTES NFR BLD AUTO: 0.4 %
LYMPHOCYTES # BLD AUTO: 1.3 K/UL
LYMPHOCYTES NFR BLD: 23.3 %
MAGNESIUM SERPL-MCNC: 2 MG/DL
MCH RBC QN AUTO: 27.7 PG
MCHC RBC AUTO-ENTMCNC: 32.1 G/DL
MCV RBC AUTO: 86 FL
MONOCYTES # BLD AUTO: 0.7 K/UL
MONOCYTES NFR BLD: 12.5 %
NEUTROPHILS # BLD AUTO: 3.2 K/UL
NEUTROPHILS NFR BLD: 57.3 %
NRBC BLD-RTO: 0 /100 WBC
PLATELET # BLD AUTO: 309 K/UL
PMV BLD AUTO: 8.8 FL
POTASSIUM SERPL-SCNC: 4.1 MMOL/L
PROT SERPL-MCNC: 6.3 G/DL
RBC # BLD AUTO: 4.8 M/UL
SODIUM SERPL-SCNC: 135 MMOL/L
WBC # BLD AUTO: 5.59 K/UL

## 2019-01-12 PROCEDURE — 85025 COMPLETE CBC W/AUTO DIFF WBC: CPT

## 2019-01-12 PROCEDURE — 83735 ASSAY OF MAGNESIUM: CPT

## 2019-01-12 PROCEDURE — 80053 COMPREHEN METABOLIC PANEL: CPT

## 2019-01-12 PROCEDURE — 25000003 PHARM REV CODE 250: Performed by: NURSE PRACTITIONER

## 2019-01-12 PROCEDURE — 36415 COLL VENOUS BLD VENIPUNCTURE: CPT

## 2019-01-12 PROCEDURE — 25000003 PHARM REV CODE 250: Performed by: STUDENT IN AN ORGANIZED HEALTH CARE EDUCATION/TRAINING PROGRAM

## 2019-01-12 PROCEDURE — 99239 HOSP IP/OBS DSCHRG MGMT >30: CPT | Mod: ,,, | Performed by: NURSE PRACTITIONER

## 2019-01-12 PROCEDURE — 25000003 PHARM REV CODE 250: Performed by: HOSPITALIST

## 2019-01-12 PROCEDURE — 99239 PR HOSPITAL DISCHARGE DAY,>30 MIN: ICD-10-PCS | Mod: ,,, | Performed by: NURSE PRACTITIONER

## 2019-01-12 RX ORDER — METOPROLOL SUCCINATE 25 MG/1
25 TABLET, EXTENDED RELEASE ORAL DAILY
Qty: 90 TABLET | Refills: 0 | Status: SHIPPED | OUTPATIENT
Start: 2019-01-12 | End: 2019-01-29 | Stop reason: SDUPTHER

## 2019-01-12 RX ORDER — METOPROLOL TARTRATE 25 MG/1
25 TABLET, FILM COATED ORAL 2 TIMES DAILY
Status: DISCONTINUED | OUTPATIENT
Start: 2019-01-12 | End: 2019-01-12 | Stop reason: HOSPADM

## 2019-01-12 RX ORDER — NITROGLYCERIN 0.4 MG/1
0.4 TABLET SUBLINGUAL EVERY 5 MIN PRN
Qty: 25 TABLET | Refills: 0 | Status: SHIPPED | OUTPATIENT
Start: 2019-01-12 | End: 2019-01-29 | Stop reason: SDUPTHER

## 2019-01-12 RX ADMIN — CLOPIDOGREL 75 MG: 75 TABLET, FILM COATED ORAL at 09:01

## 2019-01-12 RX ADMIN — FUROSEMIDE 20 MG: 20 TABLET ORAL at 09:01

## 2019-01-12 RX ADMIN — ASPIRIN 81 MG CHEWABLE TABLET 81 MG: 81 TABLET CHEWABLE at 09:01

## 2019-01-12 RX ADMIN — SPIRONOLACTONE 25 MG: 25 TABLET ORAL at 09:01

## 2019-01-12 RX ADMIN — METOPROLOL TARTRATE 25 MG: 25 TABLET ORAL at 09:01

## 2019-01-12 RX ADMIN — ATORVASTATIN CALCIUM 80 MG: 20 TABLET, FILM COATED ORAL at 09:01

## 2019-01-12 NOTE — ASSESSMENT & PLAN NOTE
- cutting down, working on stopping completely.    - Discussed quitting and counseled. Given pamphlet on smoking cessation.    - he can enroll in program for free meds and nicotine patches

## 2019-01-12 NOTE — PLAN OF CARE
BP of 80s systolic over 2 readings over last hour. Will hold PM coreg dose, EF of 8% so careful with IVF. Was euvolemic on exam yesterday PM on admit.  Will give 250 cc bolus now.

## 2019-01-12 NOTE — DISCHARGE SUMMARY
[facility] Hospital Medicine  Discharge Summary      Patient Name: Osei Castorena  MRN: [id number]  Admission Date: 1/10/2019  Hospital Length of Stay: 2 days  Discharge Date and Time:  01/12/2019 8:13 AM  Attending Physician: Robert Brock MD   Discharging Provider: Citlaly Vallejo NP  Primary Care Provider: Onesimo Vang NP  Hospital Medicine Team: [facility]  Citlaly Vallejo NP    HPI:   Mr. Castorena is a 53 year old male who was in his usual state of health until the last few weeks-months or so when he started having pressure like chest pain in the substernal area. The events happen at any time- he could be watching or walking around when they occur. They sometimes last seconds and sometimes last minutes to an hour. If they dont self resolve then he takes nitro at home and it resolves. He never has to take more than 1 nitroglycerin for this pain. He can't state how often they happen but he thinks the last one was about 1-2 weeks ago. He came in today for an AICD check up and told the doctors about it and they were concerned about unstable angina and sent him to the ED. He has a significant cardiac history with CAD with an MI/AJAY to the RCA in 2017 with a resulting EF of 8%, DD with AICD. He follows with heart transplant here and was worked up for transplant and LVAD and has not been a candidate at this time due to social concerns/lack of social support. He denies missing any doses of medications including plavix. He reports the pain is different from his last MI and describes it as pressure like without radiation. He does have associated SOB during the episodes but denies N/V.   In the ED his initial EKG was concerning for ST elevations but he is paced and therefore not reliable by typical criteria for STEMI, bedside eval of rhythm by cards with bifascicular block, cards recommends to treat as unstable angina/ACS protocol with plavix load, aspirin load, heparin drip and to cath  in AM.   On exam patient has no further complaints at this time except insomnia.    Procedure(s) (LRB):  Left heart cath (Left)      Hospital Course:   Admitted to hospital medicine for UA. He is s/p C with non-obstructive coronary disease and patent stents.  The nursing and overnight staff were concerned with his b/p in the 80's despite being asymptomatic with and EF ~ 10%, and an elevated BNP and IVC, he was given post op fluids in addition to an additional 250ml bolus.  If patient needs he can take an extra lasix when he gets home since he has a 6 hour transport ahead.  During med rec review with pharmacy they came across that he had not picked up any plavix since July of last year and has been poorly compliant with some of his other medications.      His cholesterol panel is extremely elevated and most likely 80 of lipitor will not bring down his LDL of 301 enough to be within acceptable levels.  It is our recommendation that he see his cardiologist or PCP to be started on Repatha: chol 360, HDL 37, , . His lisinopril has been discontinued to start Entresto (will get 30 days free while waiting for prior auth to go through) low dose as he has EF 13% and c/o severe fatigue/sob all the time.  He's ordered GDMT for heart failure however it appears he's not been taking his meds as prescribed as noted above.  Will attempt to renew Rx's and have delivered from [facility] pharmacy upon discharge so he has enough to get him through to next appt with Cardiology/ pcp.     Dispo: needs to f/u with cardiology and or PCP closer to home to f/u initiation of Repatha. Waiting for prior auth to complete for Entresto in interim 30 day free trial     Consults:   Consults (From admission, onward)        Status Ordering Provider     Inpatient consult to Cardiac Rehab  Once     Provider:  (Not yet assigned)    Completed DIANA DUQUE     Inpatient consult to Interventional Cardiology  Once     Provider:  (Not yet  assigned)    Completed TIFFANIE FABIAN     Inpatient consult to Interventional Cardiology  Once     Provider:  (Not yet assigned)    Completed DIANA DUQUE     Inpatient consult to Registered Dietitian/Nutritionist  Once     Provider:  (Not yet assigned)    Completed DIANA DUQUE     Inpatient consult to Social Work/Case Management  Once     Provider:  (Not yet assigned)    Acknowledged DIANA DUQUE      Review of Systems   Constitutional: Positive for activity change, appetite change (loss of appetite) and fatigue. Negative for chills and fever.   HENT: Negative for congestion, sore throat and trouble swallowing.    Eyes: Negative for redness and visual disturbance.   Respiratory: Positive for cough and shortness of breath. Negative for wheezing.    Cardiovascular: Negative for chest pain, palpitations and leg swelling.        + orthopnea, PND     Gastrointestinal: Negative for abdominal pain, constipation, diarrhea, nausea and vomiting.   Endocrine: Negative for cold intolerance and heat intolerance.   Genitourinary: Negative for decreased urine volume, difficulty urinating and hematuria.   Musculoskeletal: Negative for back pain and myalgias.   Skin: Negative for color change, rash and wound.   Allergic/Immunologic: Negative for immunocompromised state.   Neurological: Negative for dizziness, weakness, light-headedness, numbness and headaches.   Hematological: Does not bruise/bleed easily.   Psychiatric/Behavioral: Negative for agitation, decreased concentration and sleep disturbance. The patient is not nervous/anxious.      Physical Exam   Constitutional: He is oriented to person, place, and time. He appears well-developed and well-nourished. No distress.   HENT:   Head: Normocephalic and atraumatic.   Right Ear: External ear normal.   Left Ear: External ear normal.   Nose: Nose normal.   Eyes: Conjunctivae and EOM are normal.   Neck: Normal range of motion. Neck supple. No JVD  present.   Cardiovascular: Normal rate, regular rhythm and intact distal pulses.   Murmur heard.   Systolic (Mitral position) murmur is present with a grade of 3/6.  + heave    Pulmonary/Chest: Effort normal and breath sounds normal. No respiratory distress. He has no wheezes. He has no rales.   Abdominal: Soft. Bowel sounds are normal. He exhibits no distension and no mass. There is no tenderness. There is no guarding.   Musculoskeletal: Normal range of motion. He exhibits no edema.   Neurological: He is alert and oriented to person, place, and time. No cranial nerve deficit or sensory deficit. Coordination normal.   Skin: Skin is warm and dry. No rash noted. He is not diaphoretic. No erythema.   Psychiatric: He has a normal mood and affect. His behavior is normal. Judgment and thought content normal.         Chronic combined systolic and diastolic heart failure    - history of systolic heart failure with EF of 8% and diastolic dysfunction  - Ef 13%, IVC 8, GHK, PAP 29  - not volume overloaded on exam, BNP lowest it's ever been 199, CXR cardiomegaly, no effusions or edema  - continue home dose of lasix  - dc'ed lisinopril in lieu to start entresto low dose tonight's pm first dose at home, prior auth submitted, but can get 30 days free from our pharmacy  - continue GDMT aldactone, metoprolol as it seems he has metoprolol at home as well.        Smoker    - cutting down, working on stopping completely.    - Discussed quitting and counseled. Given pamphlet on smoking cessation.    - he can enroll in program for free meds and nicotine patches       Coronary artery disease involving native coronary artery of native heart without angina pectoris    -s/p PCI/stent to RCA at OSH to RCA in 2017, on plavix, asa, lipitor, metoprolol at home now, denies missing doses but he hasn't picked up plavix since July  - risk factor include continued nicotine abuse (discussed smoking, he has cut down and working on stopping  totally)  -signs of unstable angina on presentation to cards clinic (see unstable angina)  - s/p C nonobs dz, patent stents       Chest pain    - on ACS protocol       Biventricular ICD (implantable cardioverter-defibrillator) in place    - In situ       S/P angioplasty with stent    - see CAD         Final Active Diagnoses:    Diagnosis Date Noted POA    Chronic combined systolic and diastolic heart failure [I50.42] 05/14/2018 Yes    Smoker [F17.200] 10/27/2018 Yes    Coronary artery disease involving native coronary artery of native heart without angina pectoris [I25.10] 06/06/2018 Yes    Chest pain [R07.9] 01/10/2019 Yes    Biventricular ICD (implantable cardioverter-defibrillator) in place [Z95.810] 01/10/2019 Yes    S/P angioplasty with stent [Z95.9] 05/14/2018 Not Applicable      Problems Resolved During this Admission:    Diagnosis Date Noted Date Resolved POA    PRINCIPAL PROBLEM:  Unstable angina [I20.0] 01/10/2019 01/12/2019 Yes       Discharged Condition: good    Disposition: Home or Self Care    Follow Up:  Follow-up Information     Khoa Serrano MD.    Specialties:  Transplant, Cardiology  Why:  Needs to be scheduled  Contact information:  [address] [place] [id number]  [phone]                 Patient Instructions:      Ambulatory referral to Cardiology   Referral Priority: Routine Referral Type: Consultation   Referral Reason: Specialty Services Required   Referred to Provider: KHOA SERRANO Requested Specialty: Cardiology   Number of Visits Requested: 1     Diet Cardiac     Notify your health care provider if you experience any of the following:  temperature >100.4     Notify your health care provider if you experience any of the following:  persistent nausea and vomiting or diarrhea     Notify your health care provider if you experience any of the following:  severe uncontrolled pain     Notify your health care provider if you experience any of the following:  redness,  tenderness, or signs of infection (pain, swelling, redness, odor or green/yellow discharge around incision site)     Notify your health care provider if you experience any of the following:  difficulty breathing or increased cough     Notify your health care provider if you experience any of the following:  severe persistent headache     Notify your health care provider if you experience any of the following:  worsening rash     Notify your health care provider if you experience any of the following:  persistent dizziness, light-headedness, or visual disturbances     Notify your health care provider if you experience any of the following:  increased confusion or weakness     Activity as tolerated       Significant Diagnostic Studies: Labs: All labs within the past 24 hours have been reviewed    Pending Diagnostic Studies:     None         Medications:  Reconciled Home Medications:      Medication List      START taking these medications    nitroGLYCERIN 0.4 MG SL tablet  Commonly known as:  NITROSTAT  Place 1 tablet (0.4 mg total) under the tongue every 5 (five) minutes as needed for Chest pain (angina).     sacubitril-valsartan 24-26 mg per tablet  Commonly known as:  ENTRESTO  Take 1 tablet by mouth 2 (two) times daily.        CHANGE how you take these medications    furosemide 20 MG tablet  Commonly known as:  LASIX  Take 1 tablet (20 mg total) by mouth once daily.  What changed:  how much to take        CONTINUE taking these medications    aspirin 81 MG Chew  Take 1 tablet (81 mg total) by mouth once daily.     atorvastatin 80 MG tablet  Commonly known as:  LIPITOR  Take 1 tablet (80 mg total) by mouth once daily.     clopidogrel 75 mg tablet  Commonly known as:  PLAVIX  Take 1 tablet (75 mg total) by mouth once daily.     metoprolol succinate 25 MG 24 hr tablet  Commonly known as:  TOPROL-XL  Take 1 tablet (25 mg total) by mouth once daily.     spironolactone 25 MG tablet  Commonly known as:  ALDACTONE  Take 1  tablet (25 mg total) by mouth once daily.        STOP taking these medications    lisinopril 5 MG tablet  Commonly known as:  PRINIVIL,ZESTRIL            Indwelling Lines/Drains at time of discharge:   Lines/Drains/Airways          None          Time spent on the discharge of patient: 35 minutes  Patient was seen and examined on the date of discharge and determined to be suitable for discharge.         Citlaly Vallejo NP  Department of Hospital Medicine  [facility]  Spectra:  [phone]  Pager: [phone]

## 2019-01-12 NOTE — PROGRESS NOTES
Pt to be discharged home today per MD orders.  Medications were sent to [facility] [place], [place] - [address]   Spoke with pt who states he will not be able to get to pharmacy in time to  new prescriptions for tonight.  Newly prescribed Entresto has a dose due this evening.  Awaiting call back from HEIDY Vallejo to request bedside delivery. Will continue to monitor.

## 2019-01-12 NOTE — PROGRESS NOTES
BP continues to remain low, but MAP stable.  Pt asymptomatic, denying any c/o SOB, dizziness, lightheadedness, blurry vision, or H/A.  S. Lanks notified; okay to administer scheduled AM meds as ordered. Will implement as instructed.  Will continue to monitor.        01/12/19 0807   Vital Signs   Temp 98 °F (36.7 °C)   Temp src Oral   Pulse 68   Heart Rate Source Monitor   Resp 12   SpO2 (!) 94 %   O2 Device (Oxygen Therapy) room air   BP (!) 84/51   MAP (mmHg) 63   BP Location Left arm   BP Method Automatic   Patient Position Lying

## 2019-01-12 NOTE — ASSESSMENT & PLAN NOTE
- history of systolic heart failure with EF of 8% and diastolic dysfunction  - Ef 13%, IVC 8, GHK, PAP 29  - not volume overloaded on exam, BNP lowest it's ever been 199, CXR cardiomegaly, no effusions or edema  - continue home dose of lasix  - dc'ed lisinopril in lieu to start entresto low dose tonight's pm first dose at home, prior auth submitted, but can get 30 days free from our pharmacy  - continue GDMT aldactone, metoprolol as it seems he has metoprolol at home as well.

## 2019-01-12 NOTE — PLAN OF CARE
Problem: Adult Inpatient Plan of Care  Goal: Patient-Specific Goal (Individualization)  Plan of care discussed with patient. Patient is free of fall/trauma/injury. Denies CP, SOB, or pain/discomfort. All questions addressed. Heparin gtt d/c per order. [facility] today - no interventions.  Will continue to monitor

## 2019-01-12 NOTE — ASSESSMENT & PLAN NOTE
-s/p PCI/stent to RCA at OSH to RCA in 2017, on plavix, asa, lipitor, metoprolol at home now, denies missing doses but he hasn't picked up plavix since July  - risk factor include continued nicotine abuse (discussed smoking, he has cut down and working on stopping totally)  -signs of unstable angina on presentation to cards clinic (see unstable angina)  - s/p LHC nonobs dz, patent stents

## 2019-01-12 NOTE — PROGRESS NOTES
Pt discharged per MD orders.  Tele discontinued and returned to station.  IVs removed earlier; catheter tip intact x2.  Medication list and prescriptions reviewed; prescriptions sent to pt preferred pharmacy and 30-day trial of Entresto delivered to the bedside.  Pt verbalizes understanding of all written and verbal discharge instructions.  Transport downstairs; will bring pt down via wheelchair.

## 2019-01-12 NOTE — PLAN OF CARE
VERONA contacted [facility] ([phone]) to arrange transportation home. Requested pickup time is 11:00 AM. Confirmation number for trip is [phone].    RN Erica aware.    10:03 AM  VERONA informed by Maricruz with [facility] [employer] that patient will be transported home by [facility] ([phone]). Patient will be picked up before 2:00 PM today. VERONA informed RN of this information. VERONA informed RN that  will contact RN once they arrive to hospital. Pt will have to be brought down to the vehicle. RN voiced understanding.     Charlene Leone LMSW  [facility]

## 2019-01-12 NOTE — PROGRESS NOTES
01/11/19 1942   Vital Signs   BP (!) 81/50   MAP (mmHg) 61     Gloria TORRE notified. Will continue to monitor and re-check.

## 2019-01-12 NOTE — PROGRESS NOTES
01/11/19 2044   Vital Signs   BP (!) 80/51   MAP (mmHg) 59     Dr. Whitney notified, holding Coreg. Will order 250 cc bolus.

## 2019-01-12 NOTE — PLAN OF CARE
Problem: Adult Inpatient Plan of Care  Goal: Plan of Care Review  Outcome: Ongoing (interventions implemented as appropriate)  Pt remained free from falls/trauma/injuries. No complaints of CP/SOB/discomfort. LHC done 1/11; no complications noted. SBP in the 80's; 250 cc bolus given at slow rate. Coreg held due to BP. Fall bundle in place. POC explained, no questions at this time. Pt tolerating care.

## 2019-01-12 NOTE — PLAN OF CARE
Problem: Adult Inpatient Plan of Care  Goal: Plan of Care Review  Outcome: Outcome(s) achieved Date Met: 01/12/19  Pt free of falls/trauma/injuries.  Denies c/o SOB, CP, or discomfort.  Generalized skin remains CDI; no edema noted.  Pt being diuresed with Lasix 20mg PO daily; diuresing well.   Wt remains stable. Pt to be discharged today per MD orders.  Pt tolerating plan of care.

## 2019-01-12 NOTE — PROGRESS NOTES
Pt to be discharged home today per MD orders.  Per VERONA, transport to arrive before 2PM.  Per HEIDY Vallejo, PIVs may be removed now per pt request.  Orders implemented as instructed. Will continue to monitor.

## 2019-01-14 DIAGNOSIS — I50.9 CONGESTIVE HEART FAILURE, UNSPECIFIED HF CHRONICITY, UNSPECIFIED HEART FAILURE TYPE: Primary | ICD-10-CM

## 2019-01-15 ENCOUNTER — PATIENT OUTREACH (OUTPATIENT)
Dept: ADMINISTRATIVE | Facility: CLINIC | Age: 54
End: 2019-01-15

## 2019-01-15 NOTE — PATIENT INSTRUCTIONS
Unstable Angina     Unstable angina is usually caused by coronary artery disease (CAD).   Angina is a feeling of pain, tightness, pressure, or discomfort in and around your chest. It can occur if your heart muscle isnt getting enough oxygen-rich blood. There are 2 kinds of angina. They are stable and unstable. Stable angina occurs at times you can predict. This might be during or after exercise or when exerting yourself. This type of angina can often be managed with medicine or rest.  Unstable angina does not occur at predictable times. And it may not respond to the usual forms of treatment. It is a warning that a heart attack (acute myocardial infarction) is possible in the near future. For this reason, it should be treated right away. This sheet tells you more about unstable angina. If you have questions, be sure to ask your healthcare provider.  Coronary disease causes angina   Your heart is a muscle. It gets oxygen from the blood sent through the coronary arteries. Coronary artery disease (CAD) occurs when fatty material (plaque) builds up within your artery walls. Plaque irritates (inflames) the artery wall. The buildup of plaque can reduce blood flow to your heart. This reduces the amount of oxygen your heart gets. The lower amount of oxygen can cause the chest pain of angina. Other symptoms of angina include lightheadedness, shortness of breath, nausea, abdominal pain, or unexplained sweating.  The difference between stable and unstable angina  · Stable angina. This type of angina occurs most often during exercise or times of stress. Stable angina goes away when you rest or take nitroglycerin. This is a medicine that allows the heart muscle to relax. It helps increase blood flow through your arteries.  · Unstable angina. This type of angina is caused when a piece of plaque breaks off (ruptures). A blood clot can form at the site of the rupture. The clot reduces blood flow even more. Unstable angina is  "described as chest pain that occurs unpredictably even at rest. You may also be diagnosed with unstable angina if you have stable angina that becomes more severe, lasts longer, or that is not relieved by rest or medicine.  How unstable angina feels  Unstable and stable angina have the same symptoms. With unstable angina, the symptoms are more severe and last longer. Symptoms include:  · Discomfort, aching, heaviness, tightness, squeezing, or pressure. You may feel this in your chest or back. You may also feel it in your arm, shoulder, neck, jaw, or upper abdomen.  · Feeling more tired than usual for no clear reason  · Nausea  · Unexplained sweating  · Shortness of breath  Not everyone who has a heart attack has the typical symptom of chest pain. You may be having a "silent" (unrecognized) heart attack if you lose consciousness (syncope), or have confusion, weakness, or changes in thinking (delirium). Get medical help right away to find out if you are having a heart attack or another serious condition.  Your evaluation  Because unstable angina can lead to a heart attack, it is viewed as an emergency. If you are having symptoms of unstable angina, you should call 911 right away. Your healthcare provider will ask about your symptoms and examine you. Tests will be done quickly. Common tests include:  · Blood tests. These can help tell if there is damage to your heart. They may repeated often, usually every 6 to 8 hours until you have 3 sets. They can also check for cholesterol in the blood, which leads to plaque buildup. They can check for other health problems that affect the heart, such as diabetes.  · Electrocardiogram (ECG). This test records your hearts electrical patterns. A resting ECG can show if you have damage to your heart muscle or a change in the way your heart beats. A stress ECG (stress test) can show how well the blood flows to your heart during exercise.  · Angiography. This test can show where your " arteries are narrowed. The doctor puts a long tube (catheter) in an artery in your arm, neck, or leg (groin). He or she slowly guides it to your heart. Dye is sent through the tube and into the coronary arteries. This makes the arteries show up clearly on X-rays. This helps show any blockages or areas where plaques have narrowed the artery.  Treating unstable angina  Your treatment will depend on the results of your tests. Possible treatments include:  · Observation. If your symptoms are severe, you may need to stay in the hospital to be watched. If your chest pain gets better and tests show no sign of damage to your heart, you'll likely be able to go home.   · Medicines. Your healthcare provider will likely give you nitroglycerin. He or she also may give you medicines that help prevent blood clots, such as aspirin. He or she may also give you medicines to help reduce your blood pressure or slow your heart rate.  · Procedures to improve blood flow. If your chest pain does not get better, your healthcare provider may suggest procedures to improve blood flow to your heart muscle. These can include angioplasty and bypass surgery. Your provider will tell you more about these treatments if you need them.  · Lifestyle changes. Lifestyle changes usually include not smoking, eating healthy foods, and getting regular exercise. These changes will take time to reduce your risk of having a heart attack. For them to work, you will need to do them for the long term. The sooner you start, the better it will be for your overall health. If you have other health problems such as high blood pressure, diabetes, or high cholesterol, these need to be treated as well. They can increase your risk for a heart attack. Lifestyle changes can make unstable angina attacks less frequent and less severe. They also help you manage CAD and reduce your risk for a heart attack.  When taking nitroglycerin  If your healthcare provider  prescribes nitroglycerin, be sure to follow his or her instructions on how to use it. Also be sure to tell your provider if youre taking any other medicines. This includes other prescription and over-the-counter medicines, as well as herbs and other supplements. Don't take nitroglycerin if you take medicines to treat erectile dysfunction or pulmonary hypertension. These can include sildenafil, tadalafil, or vardenafil. The combination of these medicines can cause a dangerous drop in blood pressure.     When to call 911  Call 911 or go to the emergency room (ER) right away if your chest pain:  · Occurs when youre not active.  · Wakes you up from sleep.  · Comes back and is not relieved by your usual dose of nitroglycerin.  · Occurs with weakness, dizziness, fainting, heavy sweating, nausea, or vomiting.  · Lasts longer than 5 minutes.  · Feels like it's getting worse       Date Last Reviewed: 10/1/2016  © 2152-5863 [employer] [address] [place] [address], [place] [address]. All rights reserved. This information is not intended as a substitute for professional medical care. Always follow your healthcare professional's instructions.

## 2019-01-24 ENCOUNTER — TELEPHONE (OUTPATIENT)
Dept: ENDOSCOPY | Facility: HOSPITAL | Age: 54
End: 2019-01-24

## 2019-01-29 ENCOUNTER — HOSPITAL ENCOUNTER (OUTPATIENT)
Dept: PULMONOLOGY | Facility: CLINIC | Age: 54
Discharge: HOME | End: 2019-01-29
Payer: MEDICAID

## 2019-01-29 ENCOUNTER — OFFICE VISIT (OUTPATIENT)
Dept: TRANSPLANT | Facility: CLINIC | Age: 54
End: 2019-01-29
Payer: MEDICAID

## 2019-01-29 VITALS
BODY MASS INDEX: 21.45 KG/M2 | DIASTOLIC BLOOD PRESSURE: 62 MMHG | WEIGHT: 128.75 LBS | SYSTOLIC BLOOD PRESSURE: 91 MMHG | HEIGHT: 65 IN | HEART RATE: 70 BPM

## 2019-01-29 VITALS — BODY MASS INDEX: 20.83 KG/M2 | HEIGHT: 65 IN | WEIGHT: 125 LBS

## 2019-01-29 DIAGNOSIS — I25.5 ISCHEMIC CARDIOMYOPATHY: ICD-10-CM

## 2019-01-29 DIAGNOSIS — I50.42 CHRONIC COMBINED SYSTOLIC AND DIASTOLIC HEART FAILURE: ICD-10-CM

## 2019-01-29 DIAGNOSIS — Z95.820 S/P ANGIOPLASTY WITH STENT: ICD-10-CM

## 2019-01-29 DIAGNOSIS — Z76.82 HEART TRANSPLANT CANDIDATE: ICD-10-CM

## 2019-01-29 DIAGNOSIS — E78.2 MIXED HYPERLIPIDEMIA: ICD-10-CM

## 2019-01-29 DIAGNOSIS — I25.10 CORONARY ARTERY DISEASE INVOLVING NATIVE CORONARY ARTERY OF NATIVE HEART WITHOUT ANGINA PECTORIS: ICD-10-CM

## 2019-01-29 DIAGNOSIS — I50.22 CHRONIC SYSTOLIC CONGESTIVE HEART FAILURE: ICD-10-CM

## 2019-01-29 DIAGNOSIS — F17.200 SMOKER: ICD-10-CM

## 2019-01-29 DIAGNOSIS — Z95.810 BIVENTRICULAR ICD (IMPLANTABLE CARDIOVERTER-DEFIBRILLATOR) IN PLACE: Primary | ICD-10-CM

## 2019-01-29 PROCEDURE — 99213 OFFICE O/P EST LOW 20 MIN: CPT | Mod: PBBFAC,25 | Performed by: INTERNAL MEDICINE

## 2019-01-29 PROCEDURE — 99215 PR OFFICE/OUTPT VISIT, EST, LEVL V, 40-54 MIN: ICD-10-PCS | Mod: S$PBB,,, | Performed by: INTERNAL MEDICINE

## 2019-01-29 PROCEDURE — 99999 PR PBB SHADOW E&M-EST. PATIENT-LVL III: CPT | Mod: PBBFAC,,, | Performed by: INTERNAL MEDICINE

## 2019-01-29 PROCEDURE — 94618 PULMONARY STRESS TESTING: CPT | Mod: PBBFAC | Performed by: INTERNAL MEDICINE

## 2019-01-29 PROCEDURE — 94618 PULMONARY STRESS TESTING: CPT | Mod: 26,S$PBB,, | Performed by: INTERNAL MEDICINE

## 2019-01-29 PROCEDURE — 99215 OFFICE O/P EST HI 40 MIN: CPT | Mod: S$PBB,,, | Performed by: INTERNAL MEDICINE

## 2019-01-29 PROCEDURE — 94618 PULMONARY STRESS TESTING: ICD-10-PCS | Mod: 26,S$PBB,, | Performed by: INTERNAL MEDICINE

## 2019-01-29 PROCEDURE — 99999 PR PBB SHADOW E&M-EST. PATIENT-LVL III: ICD-10-PCS | Mod: PBBFAC,,, | Performed by: INTERNAL MEDICINE

## 2019-01-29 RX ORDER — SPIRONOLACTONE 25 MG/1
25 TABLET ORAL DAILY
Qty: 30 TABLET | Refills: 11 | Status: SHIPPED | OUTPATIENT
Start: 2019-01-29 | End: 2020-01-29

## 2019-01-29 RX ORDER — NITROGLYCERIN 0.4 MG/1
0.4 TABLET SUBLINGUAL EVERY 5 MIN PRN
Qty: 30 TABLET | Refills: 6 | Status: SHIPPED | OUTPATIENT
Start: 2019-01-29 | End: 2020-01-29

## 2019-01-29 RX ORDER — FUROSEMIDE 20 MG/1
20 TABLET ORAL DAILY
Qty: 30 TABLET | Refills: 12 | Status: SHIPPED | OUTPATIENT
Start: 2019-01-29

## 2019-01-29 RX ORDER — ATORVASTATIN CALCIUM 80 MG/1
80 TABLET, FILM COATED ORAL DAILY
Qty: 30 TABLET | Refills: 12 | Status: SHIPPED | OUTPATIENT
Start: 2019-01-29

## 2019-01-29 RX ORDER — CLOPIDOGREL BISULFATE 75 MG/1
75 TABLET ORAL DAILY
Qty: 30 TABLET | Refills: 12 | Status: SHIPPED | OUTPATIENT
Start: 2019-01-29

## 2019-01-29 RX ORDER — METOPROLOL SUCCINATE 25 MG/1
25 TABLET, EXTENDED RELEASE ORAL DAILY
Qty: 30 TABLET | Refills: 12 | Status: SHIPPED | OUTPATIENT
Start: 2019-01-29

## 2019-01-29 NOTE — PROGRESS NOTES
Subjective: class 3     Patient ID:  Osei Castorena is a 53 y.o. male who presents for follow-up of Heart Transplant Evaluation (3mos. w/ bnp,bmp,6mwt)      HPI   Stage D ICMP w/ Class 3 HFrEF (6/20 EF <10%, LVEDD 5.9) who comes after ICD placement 9/26/2018 who was seen by social work as part of his transplant risk stratification. He was found to be unsuitable at this time for LVAD / OHT    Here for hospital follow up. Still short of breath but no fluid in lower extremities 1200 feet in six minute walk      Hospital Course:   Admitted to hospital medicine for UA. He is s/p [facility] with non-obstructive coronary disease and patent stents.  The nursing and overnight staff were concerned with his b/p in the 80's despite being asymptomatic with and EF ~ 10%, and an elevated BNP and IVC, he was given post op fluids in addition to an additional 250ml bolus.  If patient needs he can take an extra lasix when he gets home since he has a 6 hour transport ahead.  During med rec review with pharmacy they came across that he had not picked up any plavix since July of last year and has been poorly compliant with some of his other medications.       His cholesterol panel is extremely elevated and most likely 80 of lipitor will not bring down his LDL of 301 enough to be within acceptable levels.  It is our recommendation that he see his cardiologist or PCP to be started on Repatha: chol 360, HDL 37, , . His lisinopril has been discontinued to start Entresto (will get 30 days free while waiting for prior auth to go through) low dose as he has EF 13% and c/o severe fatigue/sob all the time.  He's ordered GDMT for heart failure however it appears he's not been taking his meds as prescribed as noted above.  Will attempt to renew Rx's and [facility] so he has enough to get him through to next appt with Cardiology/ pcp.      Dispo: needs to f/u with cardiology and or PCP closer to home  to f/u initiation of Repatha. Waiting for prior auth to complete for Entresto in interim 30 day free trial         Review of Systems   Constitution: Negative for decreased appetite, weight gain and weight loss.   Cardiovascular: Negative for chest pain, dyspnea on exertion, leg swelling, near-syncope, orthopnea and palpitations.   Respiratory: Negative for cough and shortness of breath.    Musculoskeletal: Negative for myalgias.   Gastrointestinal: Negative for jaundice.        Objective:    Physical Exam   Constitutional: He is oriented to person, place, and time. He appears well-developed and well-nourished. He is active. He is not intubated.        HENT:   Head: Normocephalic and atraumatic. Hair is normal.   Right Ear: External ear normal.   Left Ear: External ear normal.   Nose: Nose normal. No nasal deformity. No epistaxis.  No foreign bodies.   Mouth/Throat: Mucous membranes are normal. Mucous membranes are not cyanotic. No oropharyngeal exudate.   Eyes: Conjunctivae and EOM are normal. Pupils are equal, round, and reactive to light.   Neck: Neck supple. No hepatojugular reflux and no JVD present.   Cardiovascular: Normal rate, regular rhythm, normal heart sounds and normal pulses. Exam reveals no gallop.   Pulmonary/Chest: Effort normal and breath sounds normal. No apnea and no tachypnea. He is not intubated. No respiratory distress. He exhibits no tenderness.   Abdominal: Soft. Normal appearance and bowel sounds are normal. There is no tenderness. No hernia.   Musculoskeletal: Normal range of motion.   Neurological: He is alert and oriented to person, place, and time. He displays no seizure activity.   Skin: Skin is warm, dry and intact. No rash noted. No pallor.   Psychiatric: He has a normal mood and affect. His speech is normal and behavior is normal. Thought content normal. Cognition and memory are normal.     BMP  Lab Results   Component Value Date     01/29/2019    K 4.2 01/29/2019      01/29/2019    CO2 28 01/29/2019    BUN 11 01/29/2019    CREATININE 0.8 01/29/2019    CALCIUM 9.7 01/29/2019    ANIONGAP 8 01/29/2019    ESTGFRAFRICA >60.0 01/29/2019    EGFRNONAA >60.0 01/29/2019           Assessment:       1. Biventricular ICD (implantable cardioverter-defibrillator) in place    2. Chronic combined systolic and diastolic heart failure    3. Chronic systolic congestive heart failure    4. Coronary artery disease involving native coronary artery of native heart without angina pectoris    5. Mixed hyperlipidemia    6. Ischemic cardiomyopathy    7. S/P angioplasty with stent    8. NOT a LVAD / Heart transplant candidate    9. Smoker         Plan:            Stable from cardiac failure standpoint. COntinue same therapy RTC 3 months

## 2019-01-29 NOTE — PROCEDURES
Osei Castorena is a 53 y.o.  male patient, who presents for a 6 minute walk test ordered by MD Moe.  The diagnosis is Pre Heart Transplant.  The patient's BMI is 20.8 kg/m2.  Predicted distance (lower limit of normal) is 516.37 meters.      Test Results:    The test was completed without stopping.    The total time walked was 360 seconds.  During walking, the patient reported:  Dyspnea, Lightheadedness. The patient used no assistive devices  during testing.     01/29/2019---------Distance: 365.76 meters (1200 feet)     O2 Sat % Supplemental Oxygen Heart Rate Blood Pressure Ced Scale   Pre-exercise  (Resting) 98 % Room Air 66 bpm 118/67 mmHg 0.5   During Exercise 99 % Room Air 82 bpm 112/72 mmHg 5-6   Post-exercise  (Recovery) 98 % Room Air  67 bpm   mmHg       Recovery Time: 76 seconds    Performing nurse/tech: Melvina WADE      PREVIOUS STUDY:   The patient had a previous study.  05/14/2018---------Distance: 426.72 meters (1400 feet)       O2 Sat % Supplemental Oxygen Heart Rate Blood Pressure Ced Scale   Pre-exercise  (Resting) 100 % Room Air 89 bpm 113/69 mmHg 4   During Exercise 100 % Room Air 100 bpm 132/71 mmHg 4   Post-exercise  (Recovery) 99 % Room Air  84 bpm   mmHg           CLINICAL INTERPRETATION:  Six minute walk distance is 365.76 meters (1200 feet) with heavy dyspnea.  During exercise, there was no significant desaturation while breathing room air.  Both blood pressure and heart rate remained stable with walking.  The patient reported non-pulmonary symptoms during exercise.  Since the previous study in May 2018, exercise capacity may be somewhat worse.  Based upon age and body mass index, exercise capacity is less than predicted.

## 2019-04-08 ENCOUNTER — OFFICE VISIT (OUTPATIENT)
Dept: TRANSPLANT | Facility: CLINIC | Age: 54
End: 2019-04-08
Payer: MEDICAID

## 2019-04-08 VITALS
WEIGHT: 125.88 LBS | DIASTOLIC BLOOD PRESSURE: 67 MMHG | BODY MASS INDEX: 20.97 KG/M2 | HEIGHT: 65 IN | SYSTOLIC BLOOD PRESSURE: 108 MMHG | HEART RATE: 65 BPM

## 2019-04-08 DIAGNOSIS — I25.10 CORONARY ARTERY DISEASE INVOLVING NATIVE CORONARY ARTERY OF NATIVE HEART WITHOUT ANGINA PECTORIS: ICD-10-CM

## 2019-04-08 DIAGNOSIS — I25.5 ISCHEMIC CARDIOMYOPATHY: ICD-10-CM

## 2019-04-08 DIAGNOSIS — Z95.810 BIVENTRICULAR ICD (IMPLANTABLE CARDIOVERTER-DEFIBRILLATOR) IN PLACE: Primary | ICD-10-CM

## 2019-04-08 DIAGNOSIS — Z95.820 S/P ANGIOPLASTY WITH STENT: ICD-10-CM

## 2019-04-08 DIAGNOSIS — F17.200 SMOKER: ICD-10-CM

## 2019-04-08 DIAGNOSIS — Z76.82 HEART TRANSPLANT CANDIDATE: ICD-10-CM

## 2019-04-08 DIAGNOSIS — I50.42 CHRONIC COMBINED SYSTOLIC AND DIASTOLIC HEART FAILURE: ICD-10-CM

## 2019-04-08 PROCEDURE — 99215 PR OFFICE/OUTPT VISIT, EST, LEVL V, 40-54 MIN: ICD-10-PCS | Mod: S$PBB,,, | Performed by: INTERNAL MEDICINE

## 2019-04-08 PROCEDURE — 99215 OFFICE O/P EST HI 40 MIN: CPT | Mod: S$PBB,,, | Performed by: INTERNAL MEDICINE

## 2019-04-08 PROCEDURE — 99213 OFFICE O/P EST LOW 20 MIN: CPT | Mod: PBBFAC | Performed by: INTERNAL MEDICINE

## 2019-04-08 PROCEDURE — 99999 PR PBB SHADOW E&M-EST. PATIENT-LVL III: ICD-10-PCS | Mod: PBBFAC,,, | Performed by: INTERNAL MEDICINE

## 2019-04-08 PROCEDURE — 99999 PR PBB SHADOW E&M-EST. PATIENT-LVL III: CPT | Mod: PBBFAC,,, | Performed by: INTERNAL MEDICINE

## 2019-04-08 NOTE — LETTER
April 8, 2019        Ricky Gannon  [address]  [address]  [place] [address]  Phone: [phone]  Fax: [phone]             [facility] [address]  [facility] [address]  Phone: [phone]   Patient: Osei Castorena   MR Number: [id number]   YOB: 1965   Date of Visit: 4/8/2019       Dear Dr. Ricky Gannon    Thank you for referring Osei Castorena to me for evaluation. Attached you will find relevant portions of my assessment and plan of care.    If you have questions, please do not hesitate to call me. I look forward to following Osei Castorena along with you.    Sincerely,    Chad Broderick MD    Enclosure    If you would like to receive this communication electronically, please contact [email or web address] or [phone] to request [employer] access.    [employer] is a tool which provides read-only access to select patient information with whom you have a relationship. Its easy to use and provides real time access to review your patients record including encounter summaries, notes, results, and demographic information.    If you feel you have received this communication in error or would no longer like to receive these types of communications, please e-mail [email or web address]

## 2019-04-08 NOTE — PROGRESS NOTES
Subjective: class 3     Patient ID:  Osei Castorena is a 53 y.o. male who presents for follow-up of Heart Transplant Pre-evaluation      HPI   Stage D ICMP w/ Class 3 HFrEF (6/20 EF <10%, LVEDD 5.9) who comes after ICD placement 9/26/2018 who was seen by social work as part of his transplant risk stratification. He was found to be unsuitable at this time for LVAD / OHT      DOing OK stable. FC II-III NYHA    Hospital Course:   Admitted to hospital medicine for UA. He is s/p [facility] with non-obstructive coronary disease and patent stents.  The nursing and overnight staff were concerned with his b/p in the 80's despite being asymptomatic with and EF ~ 10%, and an elevated BNP and IVC, he was given post op fluids in addition to an additional 250ml bolus.  If patient needs he can take an extra lasix when he gets home since he has a 6 hour transport ahead.  During med rec review with pharmacy they came across that he had not picked up any plavix since July of last year and has been poorly compliant with some of his other medications.       His cholesterol panel is extremely elevated and most likely 80 of lipitor will not bring down his LDL of 301 enough to be within acceptable levels.  It is our recommendation that he see his cardiologist or PCP to be started on Repatha: chol 360, HDL 37, , . His lisinopril has been discontinued to start Entresto (will get 30 days free while waiting for prior auth to go through) low dose as he has EF 13% and c/o severe fatigue/sob all the time.  He's ordered GDMT for heart failure however it appears he's not been taking his meds as prescribed as noted above.  Will attempt to renew Rx's and have delivered from [facility] pharmacy upon discharge so he has enough to get him through to next appt with Cardiology/ pcp.      Dispo: needs to f/u with cardiology and or PCP closer to home to f/u initiation of Repatha. Waiting for prior auth to complete for Entresto in interim 30 day free  trial         Review of Systems   Constitution: Negative for decreased appetite, weight gain and weight loss.   Cardiovascular: Negative for chest pain, dyspnea on exertion, leg swelling, near-syncope, orthopnea and palpitations.   Respiratory: Negative for cough and shortness of breath.    Musculoskeletal: Negative for myalgias.   Gastrointestinal: Negative for jaundice.        Objective:    Physical Exam   Constitutional: He is oriented to person, place, and time. He appears well-developed and well-nourished. He is active. He is not intubated.        HENT:   Head: Normocephalic and atraumatic. Hair is normal.   Right Ear: External ear normal.   Left Ear: External ear normal.   Nose: Nose normal. No nasal deformity. No epistaxis.  No foreign bodies.   Mouth/Throat: Mucous membranes are normal. Mucous membranes are not cyanotic. No oropharyngeal exudate.   Eyes: Pupils are equal, round, and reactive to light. Conjunctivae and EOM are normal.   Neck: Neck supple. No hepatojugular reflux and no JVD present.   Cardiovascular: Normal rate, regular rhythm, normal heart sounds and normal pulses. Exam reveals no gallop.   Pulmonary/Chest: Effort normal and breath sounds normal. No apnea and no tachypnea. He is not intubated. No respiratory distress. He exhibits no tenderness.   Abdominal: Soft. Normal appearance and bowel sounds are normal. There is no tenderness. No hernia.   Musculoskeletal: Normal range of motion.   Neurological: He is alert and oriented to person, place, and time. He displays no seizure activity.   Skin: Skin is warm, dry and intact. No rash noted. No pallor.   Psychiatric: He has a normal mood and affect. His speech is normal and behavior is normal. Thought content normal. Cognition and memory are normal.     BMP  Lab Results   Component Value Date     01/29/2019    K 4.2 01/29/2019     01/29/2019    CO2 28 01/29/2019    BUN 11 01/29/2019    CREATININE 0.8 01/29/2019    CALCIUM 9.7  01/29/2019    ANIONGAP 8 01/29/2019    ESTGFRAFRICA >60.0 01/29/2019    EGFRNONAA >60.0 01/29/2019           Assessment:       1. Biventricular ICD (implantable cardioverter-defibrillator) in place    2. Chronic combined systolic and diastolic heart failure    3. Coronary artery disease involving native coronary artery of native heart without angina pectoris    4. Ischemic cardiomyopathy    5. S/P angioplasty with stent    6. NOT a LVAD / Heart transplant candidate    7. Smoker         Plan:         Entresto is not covered by insurance. Prescription to [facility]          Stable from cardiac failure standpoint. COntinue same therapy RTC 3 months

## 2019-04-09 ENCOUNTER — TELEPHONE (OUTPATIENT)
Dept: PHARMACY | Facility: CLINIC | Age: 54
End: 2019-04-09

## 2019-04-16 ENCOUNTER — CLINICAL SUPPORT (OUTPATIENT)
Dept: CARDIOLOGY | Facility: HOSPITAL | Age: 54
End: 2019-04-16
Attending: INTERNAL MEDICINE
Payer: MEDICAID

## 2019-04-16 DIAGNOSIS — I25.5 ISCHEMIC CARDIOMYOPATHY: ICD-10-CM

## 2019-04-16 PROCEDURE — 93296 REM INTERROG EVL PM/IDS: CPT

## 2019-04-17 DIAGNOSIS — Z95.810 ICD (IMPLANTABLE CARDIOVERTER-DEFIBRILLATOR) IN PLACE: Primary | ICD-10-CM

## 2019-07-23 ENCOUNTER — CLINICAL SUPPORT (OUTPATIENT)
Dept: CARDIOLOGY | Facility: HOSPITAL | Age: 54
End: 2019-07-23
Attending: INTERNAL MEDICINE
Payer: MEDICAID

## 2019-07-23 DIAGNOSIS — Z95.810 ICD (IMPLANTABLE CARDIOVERTER-DEFIBRILLATOR) IN PLACE: ICD-10-CM

## 2019-07-23 PROCEDURE — 93296 REM INTERROG EVL PM/IDS: CPT

## 2019-09-11 ENCOUNTER — CLINICAL SUPPORT (OUTPATIENT)
Dept: CARDIOLOGY | Facility: HOSPITAL | Age: 54
End: 2019-09-11
Payer: MEDICAID

## 2019-09-11 DIAGNOSIS — Z95.810 PRESENCE OF AUTOMATIC (IMPLANTABLE) CARDIAC DEFIBRILLATOR: ICD-10-CM

## 2019-09-11 DIAGNOSIS — I25.5 ISCHEMIC CARDIOMYOPATHY: ICD-10-CM

## 2019-09-11 PROCEDURE — 93296 REM INTERROG EVL PM/IDS: CPT | Performed by: INTERNAL MEDICINE

## 2019-09-20 ENCOUNTER — TELEPHONE (OUTPATIENT)
Dept: CARDIOLOGY | Facility: HOSPITAL | Age: 54
End: 2019-09-20

## 2019-09-20 NOTE — TELEPHONE ENCOUNTER
Attempted to return the call to [facility] on this afternoon.  Message left on the voice mail @ [phone].  Contact # for the [facility] was left on the voice mail.

## 2019-09-20 NOTE — TELEPHONE ENCOUNTER
----- Message from Eugenia Wisdom sent at 9/20/2019 12:01 PM CDT -----  Contact: [facility] [phone] with Dr. Ricky Gannon office  [facility] says the pt would like to have his device monitored closer to home, but they need operative notes. They also would like to know what type of device the pt has?     Thanks

## 2019-12-10 ENCOUNTER — CLINICAL SUPPORT (OUTPATIENT)
Dept: CARDIOLOGY | Facility: HOSPITAL | Age: 54
End: 2019-12-10
Attending: INTERNAL MEDICINE
Payer: MEDICAID

## 2019-12-10 DIAGNOSIS — Z95.810 ICD (IMPLANTABLE CARDIOVERTER-DEFIBRILLATOR) IN PLACE: ICD-10-CM

## 2019-12-10 PROCEDURE — 93296 REM INTERROG EVL PM/IDS: CPT | Performed by: INTERNAL MEDICINE

## 2019-12-10 PROCEDURE — 93295 CARDIAC DEVICE CHECK - REMOTE: ICD-10-PCS | Mod: ,,, | Performed by: INTERNAL MEDICINE

## 2019-12-10 PROCEDURE — 93295 DEV INTERROG REMOTE 1/2/MLT: CPT | Mod: ,,, | Performed by: INTERNAL MEDICINE

## 2020-01-24 ENCOUNTER — TELEPHONE (OUTPATIENT)
Dept: ELECTROPHYSIOLOGY | Facility: CLINIC | Age: 55
End: 2020-01-24

## 2020-01-24 NOTE — TELEPHONE ENCOUNTER
Scheduled pt f/u ----- Message from Hannah Magana sent at 1/20/2020 12:19 PM CST -----      ----- Message -----  From: Lavon Cortez RN  Sent: 1/17/2020   8:01 AM CST  To: Hannah Magana    Pt is due for his annual provider visit with [facility]F or [facility], ekg, and device check (he may have changed to cardiologist closer to home, per Rodrick's note in epic).  He has a SJM ICD.  Next available is fine.  Thanks.

## 2020-03-09 ENCOUNTER — CLINICAL SUPPORT (OUTPATIENT)
Dept: CARDIOLOGY | Facility: HOSPITAL | Age: 55
End: 2020-03-09
Attending: INTERNAL MEDICINE
Payer: MEDICAID

## 2020-03-09 DIAGNOSIS — Z95.810 ICD (IMPLANTABLE CARDIOVERTER-DEFIBRILLATOR) IN PLACE: ICD-10-CM

## 2020-03-09 PROCEDURE — 93295 CARDIAC DEVICE CHECK - REMOTE: ICD-10-PCS | Mod: ,,, | Performed by: INTERNAL MEDICINE

## 2020-03-09 PROCEDURE — 93295 DEV INTERROG REMOTE 1/2/MLT: CPT | Mod: ,,, | Performed by: INTERNAL MEDICINE

## 2020-03-09 PROCEDURE — 93296 REM INTERROG EVL PM/IDS: CPT | Performed by: INTERNAL MEDICINE

## 2020-03-27 ENCOUNTER — TELEPHONE (OUTPATIENT)
Dept: ELECTROPHYSIOLOGY | Facility: CLINIC | Age: 55
End: 2020-03-27

## 2020-03-27 NOTE — TELEPHONE ENCOUNTER
Tried contacting pt about upcoming appt with  to inform him that all in clinic appts at this time will be cancelled due to COVID-19 concerns. Pt has the option to complete a Tele Visit or R/s which ever preferred.

## 2020-04-02 ENCOUNTER — TELEPHONE (OUTPATIENT)
Dept: ELECTROPHYSIOLOGY | Facility: CLINIC | Age: 55
End: 2020-04-02

## 2020-04-02 NOTE — TELEPHONE ENCOUNTER
2nd attempt to reach pt about an appt that has been cancelled with  on 04/03. Voicemail was not set up to leave voicemail.

## 2020-06-07 ENCOUNTER — CLINICAL SUPPORT (OUTPATIENT)
Dept: CARDIOLOGY | Facility: HOSPITAL | Age: 55
End: 2020-06-07
Attending: INTERNAL MEDICINE
Payer: MEDICAID

## 2020-06-07 DIAGNOSIS — Z95.810 ICD (IMPLANTABLE CARDIOVERTER-DEFIBRILLATOR) IN PLACE: ICD-10-CM

## 2020-06-07 PROCEDURE — 93296 REM INTERROG EVL PM/IDS: CPT | Performed by: INTERNAL MEDICINE

## 2020-06-07 PROCEDURE — 93295 DEV INTERROG REMOTE 1/2/MLT: CPT | Mod: ,,, | Performed by: INTERNAL MEDICINE

## 2020-06-07 PROCEDURE — 93295 CARDIAC DEVICE CHECK - REMOTE: ICD-10-PCS | Mod: ,,, | Performed by: INTERNAL MEDICINE

## 2020-10-01 ENCOUNTER — CLINICAL SUPPORT (OUTPATIENT)
Dept: CARDIOLOGY | Facility: HOSPITAL | Age: 55
End: 2020-10-01
Attending: INTERNAL MEDICINE
Payer: MEDICAID

## 2020-10-01 ENCOUNTER — TELEPHONE (OUTPATIENT)
Dept: CARDIOLOGY | Facility: HOSPITAL | Age: 55
End: 2020-10-01

## 2020-10-01 DIAGNOSIS — Z95.810 ICD (IMPLANTABLE CARDIOVERTER-DEFIBRILLATOR) IN PLACE: ICD-10-CM

## 2020-10-01 NOTE — TELEPHONE ENCOUNTER
Attempted to contact the pt on this afternoon in relation to his remote ICD home monitor and clinical follow up.  Called listed # [phone], recording stated the mailbox has not been set up therefor I was unable to leave a message.  There is documentation in the pt's chart that he is being followed by Dr. Trev Gannon @ [facility].  Remote transmission received on 9/29/20 reveals no arrhythmias recorded and all is WNL.  Pt will be released in [email or web address] remote web site to allow his local Cardiologist's office to register him into that clinic.

## 2020-10-02 ENCOUNTER — DOCUMENTATION ONLY (OUTPATIENT)
Dept: CARDIOLOGY | Facility: HOSPITAL | Age: 55
End: 2020-10-02

## 2020-10-02 NOTE — PROGRESS NOTES
Contacted the pt on this afternoon in relation to his remote ICD home monitor.  Pt confirmed that he is actively being followed by Dr. Ricky Gannon and has an upcoming appt the end of this month.  Informed the pt that his remote monitor is currently still registered to this clinic and that I would contact their office in relation to this.  Attempted to contact the [facility] @  Dr. Gannon's office.  Detailed message was left on the voice mail @ [phone] letting them know that the pt has now been released in [email or web address] to allow them to register the pt to that Clinic.  Contact # for the Device [facility] was also left on the voicemail.

## 2020-12-31 ENCOUNTER — DOCUMENTATION ONLY (OUTPATIENT)
Dept: CARDIOLOGY | Facility: HOSPITAL | Age: 55
End: 2020-12-31

## 2020-12-31 ENCOUNTER — CLINICAL SUPPORT (OUTPATIENT)
Dept: CARDIOLOGY | Facility: HOSPITAL | Age: 55
End: 2020-12-31
Attending: NURSE PRACTITIONER
Payer: MEDICAID

## 2020-12-31 NOTE — PROGRESS NOTES
Contacted the pt on this afternoon in relation to remote ICD transmission that was received on 12/29/20 as to this pt is actively followed by Dr. Ricky Gannon @ [facility]. Pt stated he was seen in that office on last month and did have an in clinic ICD ck @ that visit.  2nd message left on the voice mail @  [phone] which does state this is the nurse for Dr. Gannon.

## 2021-02-17 ENCOUNTER — DOCUMENTATION ONLY (OUTPATIENT)
Dept: CARDIOLOGY | Facility: HOSPITAL | Age: 56
End: 2021-02-17

## 2021-12-28 ENCOUNTER — TELEPHONE (OUTPATIENT)
Dept: ELECTROPHYSIOLOGY | Facility: CLINIC | Age: 56
End: 2021-12-28
Payer: MEDICAID

## 2021-12-29 ENCOUNTER — TELEPHONE (OUTPATIENT)
Dept: ELECTROPHYSIOLOGY | Facility: CLINIC | Age: 56
End: 2021-12-29
Payer: MEDICAID

## 2023-07-11 NOTE — LETTER
January 29, 2019        Ricky Gannon  [address] [facility] [facility]  [address]  [place] [address]  Phone: [phone]  Fax: [phone]             [facility] [address]  [facility] [address]  Phone: [phone]   Patient: Osei Castorena   MR Number: [id number]   YOB: 1965   Date of Visit: 1/29/2019       Dear Dr. Ricky Gannon    Thank you for referring Osei Castorena to me for evaluation. Attached you will find relevant portions of my assessment and plan of care.    If you have questions, please do not hesitate to call me. I look forward to following Osei Castorena along with you.    Sincerely,    Chad Broderick MD    Enclosure    If you would like to receive this communication electronically, please contact [email or web address] or [phone] to request [employer] access.    [employer] is a tool which provides read-only access to select patient information with whom you have a relationship. Its easy to use and provides real time access to review your patients record including encounter summaries, notes, results, and demographic information.    If you feel you have received this communication in error or would no longer like to receive these types of communications, please [email or web address] [email or web address]       
decreased ability to use legs for bridging/pushing

## (undated) DEVICE — SEE MEDLINE ITEM 157187

## (undated) DEVICE — WIRE GUIDE SAFE-T-J .035 260CM

## (undated) DEVICE — WIRE BENTSON 035/180

## (undated) DEVICE — KIT GLIDESHEATH SLEND 6FR 10CM

## (undated) DEVICE — CATH ANG PIGTAIL 4FR INFINITY

## (undated) DEVICE — CATH JACKY RADIAL 5FR 100CM

## (undated) DEVICE — OMNIPAQUE 350 200ML

## (undated) DEVICE — KIT CUSTOM MANIFOLD

## (undated) DEVICE — SEE MEDLINE ITEM 156894

## (undated) DEVICE — SPIKE CONTRAST CONTROLLER

## (undated) DEVICE — HEMOSTAT VASC BAND REG 24CM